# Patient Record
Sex: FEMALE | Race: WHITE | Employment: FULL TIME | URBAN - METROPOLITAN AREA
[De-identification: names, ages, dates, MRNs, and addresses within clinical notes are randomized per-mention and may not be internally consistent; named-entity substitution may affect disease eponyms.]

---

## 2017-03-10 ENCOUNTER — TELEPHONE (OUTPATIENT)
Dept: FAMILY MEDICINE CLINIC | Age: 32
End: 2017-03-10

## 2017-05-23 DIAGNOSIS — M54.5 LOW BACK PAIN, UNSPECIFIED BACK PAIN LATERALITY, UNSPECIFIED CHRONICITY, WITH SCIATICA PRESENCE UNSPECIFIED: Primary | ICD-10-CM

## 2017-05-23 RX ORDER — METHYLPREDNISOLONE 4 MG/1
TABLET ORAL
Qty: 21 KIT | Refills: 0 | Status: SHIPPED | OUTPATIENT
Start: 2017-05-23 | End: 2017-07-18

## 2017-06-12 DIAGNOSIS — M54.5 LOW BACK PAIN, UNSPECIFIED BACK PAIN LATERALITY, UNSPECIFIED CHRONICITY, WITH SCIATICA PRESENCE UNSPECIFIED: ICD-10-CM

## 2017-06-12 DIAGNOSIS — M53.3 SI (SACROILIAC) PAIN: Primary | ICD-10-CM

## 2017-07-18 ENCOUNTER — OFFICE VISIT (OUTPATIENT)
Dept: ORTHOPEDIC SURGERY | Age: 32
End: 2017-07-18

## 2017-07-18 VITALS — HEIGHT: 68 IN | BODY MASS INDEX: 33.48 KG/M2 | WEIGHT: 220.9 LBS

## 2017-07-18 DIAGNOSIS — M79.605 LEFT LEG PAIN: ICD-10-CM

## 2017-07-18 DIAGNOSIS — M54.5 LOW BACK PAIN, UNSPECIFIED BACK PAIN LATERALITY, UNSPECIFIED CHRONICITY, WITH SCIATICA PRESENCE UNSPECIFIED: Primary | ICD-10-CM

## 2017-07-18 DIAGNOSIS — M46.1 SACROILIITIS (HCC): ICD-10-CM

## 2017-07-18 PROBLEM — M79.604 RIGHT LEG PAIN: Status: ACTIVE | Noted: 2017-07-18

## 2017-07-18 PROBLEM — M54.50 LOW BACK PAIN: Status: ACTIVE | Noted: 2017-07-18

## 2017-07-18 PROCEDURE — 99999 PR OFFICE/OUTPT VISIT,PROCEDURE ONLY: CPT | Performed by: FAMILY MEDICINE

## 2017-07-18 RX ORDER — METHYLPREDNISOLONE 4 MG/1
TABLET ORAL
Qty: 21 KIT | Refills: 0 | Status: SHIPPED | OUTPATIENT
Start: 2017-07-18 | End: 2017-07-26 | Stop reason: ALTCHOICE

## 2017-07-25 ENCOUNTER — HOSPITAL ENCOUNTER (OUTPATIENT)
Dept: MRI IMAGING | Age: 32
Discharge: HOME | End: 2017-07-25
Attending: FAMILY MEDICINE | Admitting: FAMILY MEDICINE

## 2017-07-25 DIAGNOSIS — M54.50 LOW BACK PAIN: ICD-10-CM

## 2017-07-25 DIAGNOSIS — M79.605 LEFT LEG PAIN: ICD-10-CM

## 2017-07-25 DIAGNOSIS — M54.5 LOW BACK PAIN, UNSPECIFIED BACK PAIN LATERALITY, UNSPECIFIED CHRONICITY, WITH SCIATICA PRESENCE UNSPECIFIED: ICD-10-CM

## 2017-07-25 DIAGNOSIS — M46.1 SACROILIITIS (HCC): ICD-10-CM

## 2017-07-26 ENCOUNTER — TELEPHONE (OUTPATIENT)
Dept: ORTHOPEDIC SURGERY | Age: 32
End: 2017-07-26

## 2017-07-26 ENCOUNTER — OFFICE VISIT (OUTPATIENT)
Dept: ORTHOPEDIC SURGERY | Age: 32
End: 2017-07-26

## 2017-07-26 VITALS
HEART RATE: 76 BPM | WEIGHT: 220.9 LBS | DIASTOLIC BLOOD PRESSURE: 81 MMHG | SYSTOLIC BLOOD PRESSURE: 129 MMHG | BODY MASS INDEX: 33.48 KG/M2 | HEIGHT: 68 IN

## 2017-07-26 DIAGNOSIS — M48.061 LUMBAR STENOSIS: ICD-10-CM

## 2017-07-26 DIAGNOSIS — M54.16 LUMBAR RADICULITIS: Primary | ICD-10-CM

## 2017-07-26 PROCEDURE — 99203 OFFICE O/P NEW LOW 30 MIN: CPT | Performed by: PHYSICIAN ASSISTANT

## 2017-07-26 RX ORDER — ACETAMINOPHEN AND CODEINE PHOSPHATE 120; 12 MG/5ML; MG/5ML
SOLUTION ORAL
COMMUNITY
Start: 2017-06-29 | End: 2018-09-13 | Stop reason: ALTCHOICE

## 2017-07-27 ENCOUNTER — PAT TELEPHONE (OUTPATIENT)
Dept: PREADMISSION TESTING | Age: 32
End: 2017-07-27

## 2017-08-01 ENCOUNTER — HOSPITAL ENCOUNTER (OUTPATIENT)
Dept: PAIN MANAGEMENT | Age: 32
Discharge: HOME | End: 2017-08-01
Attending: PHYSICAL MEDICINE & REHABILITATION | Admitting: PHYSICAL MEDICINE & REHABILITATION

## 2017-08-01 VITALS
SYSTOLIC BLOOD PRESSURE: 110 MMHG | DIASTOLIC BLOOD PRESSURE: 69 MMHG | HEART RATE: 76 BPM | OXYGEN SATURATION: 100 % | TEMPERATURE: 98.4 F | RESPIRATION RATE: 16 BRPM | BODY MASS INDEX: 32.58 KG/M2 | WEIGHT: 215 LBS | HEIGHT: 68 IN

## 2017-08-01 LAB — PREGNANCY, URINE: NEGATIVE

## 2017-08-01 RX ORDER — IBUPROFEN 800 MG/1
800 TABLET ORAL EVERY 6 HOURS PRN
COMMUNITY
End: 2017-09-15 | Stop reason: ALTCHOICE

## 2017-08-01 ASSESSMENT — ACTIVITIES OF DAILY LIVING (ADL): EFFECT OF PAIN ON DAILY ACTIVITIES: STANDING

## 2017-08-01 ASSESSMENT — PAIN DESCRIPTION - DESCRIPTORS: DESCRIPTORS: BURNING;ACHING;TINGLING

## 2017-08-01 ASSESSMENT — PAIN - FUNCTIONAL ASSESSMENT
PAIN_FUNCTIONAL_ASSESSMENT: 0-10
PAIN_FUNCTIONAL_ASSESSMENT: 0-10

## 2017-08-14 ENCOUNTER — TELEPHONE (OUTPATIENT)
Dept: ORTHOPEDIC SURGERY | Age: 32
End: 2017-08-14

## 2017-08-15 ENCOUNTER — HOSPITAL ENCOUNTER (OUTPATIENT)
Dept: PAIN MANAGEMENT | Age: 32
Discharge: HOME | End: 2017-08-15
Attending: PHYSICAL MEDICINE & REHABILITATION | Admitting: PHYSICAL MEDICINE & REHABILITATION

## 2017-08-15 VITALS
OXYGEN SATURATION: 100 % | DIASTOLIC BLOOD PRESSURE: 72 MMHG | WEIGHT: 210 LBS | BODY MASS INDEX: 31.83 KG/M2 | HEART RATE: 84 BPM | HEIGHT: 68 IN | SYSTOLIC BLOOD PRESSURE: 110 MMHG | TEMPERATURE: 97.9 F | RESPIRATION RATE: 16 BRPM

## 2017-08-15 LAB — PREGNANCY, URINE: NEGATIVE

## 2017-08-15 ASSESSMENT — PAIN DESCRIPTION - DESCRIPTORS: DESCRIPTORS: ACHING

## 2017-08-15 ASSESSMENT — PAIN - FUNCTIONAL ASSESSMENT
PAIN_FUNCTIONAL_ASSESSMENT: 0-10
PAIN_FUNCTIONAL_ASSESSMENT: 0-10

## 2017-08-15 ASSESSMENT — ACTIVITIES OF DAILY LIVING (ADL): EFFECT OF PAIN ON DAILY ACTIVITIES: STANDING

## 2017-08-23 RX ORDER — MELOXICAM 15 MG/1
TABLET ORAL
Qty: 30 TABLET | Refills: 1 | Status: SHIPPED | OUTPATIENT
Start: 2017-08-23 | End: 2017-10-03 | Stop reason: ALTCHOICE

## 2017-09-15 ENCOUNTER — OFFICE VISIT (OUTPATIENT)
Dept: FAMILY MEDICINE CLINIC | Age: 32
End: 2017-09-15

## 2017-09-15 VITALS
BODY MASS INDEX: 31.61 KG/M2 | TEMPERATURE: 98.2 F | DIASTOLIC BLOOD PRESSURE: 70 MMHG | HEIGHT: 68 IN | WEIGHT: 208.6 LBS | SYSTOLIC BLOOD PRESSURE: 118 MMHG

## 2017-09-15 DIAGNOSIS — K62.5 RECTAL BLEEDING: ICD-10-CM

## 2017-09-15 DIAGNOSIS — K62.5 RECTAL BLEEDING: Primary | ICD-10-CM

## 2017-09-15 LAB
HCT VFR BLD CALC: 38.6 % (ref 36–48)
HEMOGLOBIN: 13.4 G/DL (ref 12–16)
MCH RBC QN AUTO: 30.1 PG (ref 26–34)
MCHC RBC AUTO-ENTMCNC: 34.8 G/DL (ref 31–36)
MCV RBC AUTO: 86.6 FL (ref 80–100)
PDW BLD-RTO: 12.8 % (ref 12.4–15.4)
PLATELET # BLD: 205 K/UL (ref 135–450)
PMV BLD AUTO: 7 FL (ref 5–10.5)
RBC # BLD: 4.46 M/UL (ref 4–5.2)
WBC # BLD: 7.4 K/UL (ref 4–11)

## 2017-09-15 PROCEDURE — 99213 OFFICE O/P EST LOW 20 MIN: CPT | Performed by: FAMILY MEDICINE

## 2017-09-18 ENCOUNTER — OFFICE VISIT (OUTPATIENT)
Dept: ORTHOPEDIC SURGERY | Age: 32
End: 2017-09-18

## 2017-09-18 VITALS
WEIGHT: 208.56 LBS | BODY MASS INDEX: 31.61 KG/M2 | SYSTOLIC BLOOD PRESSURE: 123 MMHG | HEART RATE: 77 BPM | DIASTOLIC BLOOD PRESSURE: 77 MMHG | HEIGHT: 68 IN

## 2017-09-18 DIAGNOSIS — M48.061 LUMBAR STENOSIS: ICD-10-CM

## 2017-09-18 DIAGNOSIS — M54.16 LUMBAR RADICULITIS: Primary | ICD-10-CM

## 2017-09-18 PROCEDURE — 99213 OFFICE O/P EST LOW 20 MIN: CPT | Performed by: PHYSICAL MEDICINE & REHABILITATION

## 2017-09-18 RX ORDER — DICLOFENAC SODIUM 75 MG/1
75 TABLET, DELAYED RELEASE ORAL 2 TIMES DAILY
Qty: 180 TABLET | Refills: 3 | Status: SHIPPED | OUTPATIENT
Start: 2017-09-18 | End: 2018-09-13 | Stop reason: ALTCHOICE

## 2017-09-26 ENCOUNTER — TELEPHONE (OUTPATIENT)
Dept: ORTHOPEDIC SURGERY | Age: 32
End: 2017-09-26

## 2017-10-03 ENCOUNTER — HOSPITAL ENCOUNTER (OUTPATIENT)
Dept: PAIN MANAGEMENT | Age: 32
Discharge: HOME | End: 2017-10-03
Attending: PHYSICAL MEDICINE & REHABILITATION | Admitting: PHYSICAL MEDICINE & REHABILITATION

## 2017-10-03 VITALS
OXYGEN SATURATION: 100 % | TEMPERATURE: 97.4 F | HEIGHT: 68 IN | DIASTOLIC BLOOD PRESSURE: 69 MMHG | RESPIRATION RATE: 14 BRPM | HEART RATE: 65 BPM | SYSTOLIC BLOOD PRESSURE: 107 MMHG | WEIGHT: 210 LBS | BODY MASS INDEX: 31.83 KG/M2

## 2017-10-03 LAB — PREGNANCY, URINE: NEGATIVE

## 2017-10-03 ASSESSMENT — PAIN DESCRIPTION - DESCRIPTORS: DESCRIPTORS: RADIATING;SHARP;BURNING

## 2017-10-03 ASSESSMENT — PAIN - FUNCTIONAL ASSESSMENT
PAIN_FUNCTIONAL_ASSESSMENT: 0-10
PAIN_FUNCTIONAL_ASSESSMENT: 0-10

## 2017-10-03 ASSESSMENT — ACTIVITIES OF DAILY LIVING (ADL): EFFECT OF PAIN ON DAILY ACTIVITIES: SITTING

## 2017-10-03 NOTE — IP AVS SNAPSHOT
Meningococcal MCV4P (Menactra) 2001 -- -- --    Td 2000 -- -- --    Tdap (Boostrix, Adacel) 2014 -- -- --    Comment: [facility]      Last Vitals          Most Recent Value    Temp  97.4 °F (36.3 °C)    Pulse  58    Resp  12    BP  106/77         After Visit Summary    This summary was created for you. Thank you for entrusting your care to us. The following information includes details about your hospital/visit stay along with steps you should take to help with your recovery once you leave the hospital.  In this packet, you will find information about the topics listed below:    · Instructions about your medications including a list of your home medications  · A summary of your hospital visit  · Follow-up appointments once you have left the hospital  · Your care plan at home      You may receive a survey regarding the care you received during your stay. Your input is valuable to us. We encourage you to complete and return your survey in the envelope provided. We hope you will choose us in the future for your healthcare needs. Patient Information     Patient Name PHIL Barrios Brochure 1985      Care Provided at:     Name Address Phone       [facility] [address] [phone]            Your Visit    Here you will find information about your visit, including the reason for your visit. Please take this sheet with you when you visit your doctor or other health care provider in the future. It will help determine the best possible medical care for you at that time. If you have any questions once you leave the hospital, please call the department phone number listed below. Why you were here     Your primary diagnosis was:  Not on File      Visit Information     Date & Time Provider Department Dept.  Phone    10/3/2017 Phu Palumbo MD [address] [place] [address] THERAPY [phone]       Follow-up Appointments Below is a list of your follow-up and future appointments. This may not be a complete list as you may have made appointments directly with providers that we are not aware of or your providers may have made some for you. Please call your providers to confirm appointments. It is important to keep your appointments. Please bring your current insurance card, photo ID, co-pay, and all medication bottles to your appointment. If self-pay, payment is expected at the time of service. Future Appointments     10/3/2017 12:45 PM     Appointment with Marizol Ventura MD; [address] [facility] [place] [address] ([phone])   [address] [facility] [id number]       10/16/2017 3:40 PM     Appointment with Jen Perez PA-C at [place] ([phone])   Please arrive 15 minutes prior to appointment, bring photo ID and insurance card. [id number]        Date Due    Yearly Flu Vaccine (1) 9/1/2017    Pap Smear 8/6/2018    Tetanus Combination Vaccine (7 - Td) 4/5/2024                 Care Plan Once You Return Home    This section includes instructions you will need to follow once you leave the hospital.  Your care team will discuss these with you, so you and those caring for you know how to best care for your health needs at home. This section may also include educational information about certain health topics that may be of help to you. Discharge Instructions       [address] [place] [address]          [phone]  POST EPIDURAL STEROID INJECTION    PATIENT INSTRUCTIONS:  1)  DIET     RESUME NORMAL DIET     RESUME ALL PRIOR MEDICATIONS  2)  ACTIVITY     DO NOT STAY ALONE FOR 4-6 HOURS AFTER THE PROCEDURE    REST TODAY    DO NOT DRIVE TODAY AND 24 HOURS IF YOU RECEIVED SEDATION. IF YOU ARE   SEEN DRIVING DURING THIS TIME THE PROPER AUTHORITIES WILL BE NOTIFIED.   DO NOT SIGN ANY LEGAL DOCUMENTS, MAKE ANY MAJOR DECISIONS, OR BE INVOLVED IN WORK DECISIONS FOR THE REMAINDER OF THE DAY.   RESUME NORMAL ACTIVITIES. DO NOT OVER EXERT YOURSELF.    SHOWER OR BATHE AS NORMAL  3)  SITE CARE     MAY USE ICE TO SITE IF NEEDED                KEEP SITE DRY FOR 12 HOURS. IF A BAND-AID WAS APPLIED TO THE                 INJECTION  SITE REMOVE BAND-AID THE FOLLOWING DAY    OBSERVE PUNCTURE SITE FOR SIGNS OF INFECTION (REDNESS, WARMTH, SWELLING, PURULENT DRAINAGE, INCREASED TENDERNESS OR FEVER GREATER THAN 101)    REPORT SIGNS OF INFECTION TO THE PHYSICIAN  4)  EXPECTED SIDE EFFECTS     FLUID RETENTION     NERVOUS ENERGY     SLEEPLESSNESS     MUSCLE SPASMS   TEMPORARY WEAKNESS/TINGLING/NUMBNESS IN THE EXTREMITIES    PAIN AT INJECTION SITE     DROWSINESS    POSSIBLE ELEVATION OF BLOOD SUGARS IF YOU ARE DIABETIC  5)  TO REACH DR. KING    CALL IF SYMPTOMS WORSEN SEVERELY OR FOR A SEVERE HEADACHE THAT WORSENS WHEN UPRIGHT       CALL IF YOU DEVELOP A FEVER GREATER THAN 101    CALL [phone] TO MAKE A FOLLOW UP APPOINTMENT WITH DR. Candy Nance     CALL REFERRING DOCTOR IF ANY PROBLEMS   6)  ADDITIONAL INSTRUCTIONS    IT MAY TAKE 24 TO 39 HOURS TO FEEL IMPROVEMENT            MyChart Signup     Our records indicate that you have an active [employer] account. You can view your After Visit Summary by going to [email or web address] and logging in with your [employer] username and password. If you don't have a [employer] username and password but a parent or guardian has access to your record, the parent or guardian should login with their own [employer] username and password and access your record to view the After Visit Summary. Additional Information  If you have questions, please contact the physician practice where you receive care. Remember, [employer] is NOT to be used for urgent needs. For medical emergencies, dial 911. For questions regarding your [employer] account call [phone]. If you have a clinical question, please call your doctor's office. View your information online  ? Review your current list of  medications, immunization, and allergies. ? Review your future test results online . ? Review your discharge instructions provided by your caregivers at discharge    Certain functionality such as prescription refills, scheduling appointments or sending messages to your provider are not activated if your provider does not use [employer] in his/her office    For questions regarding your [employer] account call [phone]. If you have a clinical question, please call your doctor's office. The information on all pages of the After Visit Summary has been reviewed with me, the patient and/or responsible adult, by my health care provider(s). I had the opportunity to ask questions regarding this information. I understand I should dispose of my armband safely at home to protect my health information. A complete copy of the After Visit Summary has been given to me, the patient and/or responsible adult.            Patient Signature/Responsible Adult:____________________    Clinician Signature:_____________________    Date:_____________________    Time:_____________________

## 2017-10-03 NOTE — IP AVS SNAPSHOT
Patient Information     Patient Name PHIL Recio Both 1985         This is your updated medication list to keep with you all times      TAKE these medications     JEN 0.35 MG tablet   Generic drug:  norethindrone       diclofenac 75 MG EC tablet   Commonly known as:  VOLTAREN   Take 1 tablet by mouth 2 times daily       PRENATAL 1 PO         ASK your doctor about these medications     COLACE PO

## 2017-10-04 NOTE — OP NOTE
Patient:  Brenda Irwin Record #:  [id number]   Date:  10-3-17  Physician:  Antonia August M.D. Facility: [facility]     Pre-op diagnosis: Lumbar spondylosis, lumbar radiculitis  Post-op diagnosis:  same  Procedure: Left L5/S1 interlaminar epidural injection #3 with flouroscopic guidance  Anesthesia: Local  Procedure Note:    The patient was admitted through pre-op and written consent was obtained. The patient was advised of the risks and benefits of the procedure, including but not limited to the following: bleeding, pain, infection, temporary paralysis, nerve damage and spinal headache. The patient was given the opportunity to ask questions. There were no contraindications for this procedure. The appropriate area was prepped and draped in a sterile fashion. Landmarks were identified and marked. The skin and soft tissues were anesthetized with 1% lidocaine. A 20G 4.5 inch Touhy needle was advanced to the left* L5/S1 interlaminar space using fluoroscopic guidance with ideal needle tip placement confirmed by multiple views. Injection of contrast showed epidural flow. There were no signs of intravascular or intrathecal injection. 80 mg depomedrol and 1cc 1% lidocaine were then injected. There were no complications and the patient tolerated the procedure well. The patient was transferred to the recovery area and monitored. Discharge instructions were given. The patient is to contact me for any post-procedure concerns. The patient is to follow up as scheduled.     DANUTA:2 cm     Antonia August MD

## 2017-10-16 ENCOUNTER — OFFICE VISIT (OUTPATIENT)
Dept: ORTHOPEDIC SURGERY | Age: 32
End: 2017-10-16

## 2017-10-16 VITALS
SYSTOLIC BLOOD PRESSURE: 115 MMHG | HEIGHT: 68 IN | HEART RATE: 69 BPM | DIASTOLIC BLOOD PRESSURE: 72 MMHG | WEIGHT: 210.1 LBS | BODY MASS INDEX: 31.84 KG/M2

## 2017-10-16 DIAGNOSIS — M48.061 SPINAL STENOSIS OF LUMBAR REGION, UNSPECIFIED WHETHER NEUROGENIC CLAUDICATION PRESENT: Primary | ICD-10-CM

## 2017-10-16 DIAGNOSIS — M54.16 LUMBAR RADICULITIS: ICD-10-CM

## 2017-10-16 PROCEDURE — 99212 OFFICE O/P EST SF 10 MIN: CPT | Performed by: PHYSICIAN ASSISTANT

## 2017-10-16 NOTE — PROGRESS NOTES
Follow up Injection: SPINE    CHIEF COMPLAINT:    Chief Complaint   Patient presents with    Back Pain     F/u EVANGELIST       HISTORY OF PRESENT ILLNESS:                The patient is a 33 y.o. female  w/Dr. Shannan Camilo, here to follow up after left L5-S1 LESI #3 from 10/3/2017 for a 9-10 month history now of aching low back pain radiating into the left buttock and lateral calf with numbness and burning. Symptoms initially increased with prolonged standing and transition. Some relief with stretching. Currently states she is overall 80% improved at this time. She reports increased functionality and is now able to advance in some of her home exercises. Currently denies any significant lower extremity radiating pain, no progressive numbness tingling weakness. No side effects the procedure. 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 Headache: no              2.Cramping:  no    3. Fever/Chills: no            4. Other: no    Past Medical History: Medical history form was reviewed & scanned into the chart until Media tab  History reviewed. No pertinent past medical history. REVIEW OF SYSTEMS:   CONSTITUTIONAL: Denies unexplained weight loss, fevers, chills or fatigue  NEUROLOGIC: Denies tremors or seizures         PHYSICAL EXAM:    Vitals: Blood pressure 115/72, pulse 69, height 5' 7.99\" (1.727 m), weight 210 lb 1.6 oz (95.3 kg), unknown if currently breastfeeding. GENERAL EXAM:  · General Apparence: Patient is adequately groomed with no evidence of malnutrition. · Orientation: The patient is oriented to time, place and person.    · Mood & Affect:The patient's mood and affect are appropriate above  2) Cont Diclofenac 75mg I po BID PRN  3) F/u PRN            [facility]

## 2017-10-20 ENCOUNTER — HOSPITAL ENCOUNTER (OUTPATIENT)
Dept: PHYSICAL THERAPY | Age: 32
Discharge: HOME | End: 2017-10-31
Admitting: PHYSICIAN ASSISTANT

## 2017-10-20 NOTE — FLOWSHEET NOTE
[place] [address] [facility], [address] [place]  Phone: [phone]  Fax [phone]    Physical Therapy Daily Treatment Note  Date:  10/24/2017    Patient Name:  Alexandru Gill    :  1985  MRN: [id number]  Restrictions/Precautions:    Physician Information:  Referring Practitioner: Dr. Tracy Matthews  Medical/Treatment Diagnosis Information:  Diagnosis: M54.16 (ICD-10-CM) - Lumbar radiculitis, M48.061 (ICD-10-CM) - Spinal stenosis of lumbar region, unspecified whether neurogenic claudication present  Treatment Diagnosis:M54.16 (ICD-10-CM) - Lumbar radiculitis                                         [x] Conservative / [] Surgical - DOS:  Therapy Diagnosis/Practice Pattern:  Practice Pattern F: Spinal Disorders  Insurance/Certification information:  PT Insurance Information: 10/20/17 MED MUT - $0CP - $750DED(MET) - PT/OT MED NEC - 100% - NO AUTH  Plan of care signed: [] YES  [] NO  Number of Comorbidities:  [x]0     []1-2    []3+  Date of Patient follow up with Physician:     G-Code (if applicable):      Date G-Code Applied:  10/20/17  PT G-Codes  Functional Assessment Tool Used: modified oswestry  Score: 20%  Functional Limitation: Changing and maintaining body position  Changing and Maintaining Body Position Current Status (): At least 20 percent but less than 40 percent impaired, limited or restricted  Changing and Maintaining Body Position Goal Status ():  At least 1 percent but less than 20 percent impaired, limited or restricted    Progress Note: [x]  Yes  []  No  Next due by: Visit #10        Latex Allergy:  [x]NO      []YES  Preferred Language for Healthcare:   [x]English       []other:    Visit # Insurance Allowable Reporting Period   1 BMN Begin Date: 10/24/2017               End Date:      RECERT DUE BY:     Pain level:  1-5/10     SUBJECTIVE:  See eval    OBJECTIVE: See eval  Observation:  Palpation:     Test motor skill, proprioception of scapular, scapulothoracic and UE control with self care, reaching, carrying, lifting, house/yardwork, driving/computer work      Manual Treatments:  PROM / STM / Oscillations-Mobs:  G-I, II, III, IV (PA's, Inf., Post.)  [x] (05581) Provided manual therapy to mobilize soft tissue/joints of cervical/CT, scapular GHJ and UE for the purpose of modulating pain, promoting relaxation,  increasing ROM, reducing/eliminating soft tissue swelling/inflammation/restriction, improving soft tissue extensibility and allowing for proper ROM for normal function with self care, reaching, carrying, lifting, house/yardwork, driving/computer work    Modalities:  PM/HP x15'    Charges:  Timed Code Treatment Minutes: 25   Total Treatment Minutes: 60     [x] EVAL (LOW) 50760 (typically 20 minutes face-to-face)  [] EVAL (MOD) 73939 (typically 30 minutes face-to-face)  [] EVAL (HIGH) 72488 (typically 45 minutes face-to-face)  [] RE-EVAL     [x] DQ(78674) x  1   [] IONTO  [] NMR (87404) x      [] VASO  [x] Manual (50577) x  1    [] Other:  [] TA x       [] Mech Traction (93300)  [] ES(attended) (66403)      [x] ES (un) (11664):     GOALS:  Patient stated goal: To be able to wake and exercise without pain    Therapist goals for Patient:   Short Term Goals: To be achieved in: 2 weeks  1. Independent in HEP and progression per patient tolerance, in order to prevent re-injury. 2. Patient will have a decrease in pain to facilitate improvement in movement, function, and ADLs as indicated by Functional Deficits. Long Term Goals: To be achieved in: 6 weeks  1. Disability index score of 10% or less for the [place] to assist with reaching prior level of function. 2. Patient will demonstrate increased AROM to WNL, good LS mobility, good hip ROM to allow for proper joint functioning as indicated by patients Functional Deficits.    3. Patient will demonstrate an increase in Strength to good proximal hip and core activation to allow for proper functional mobility as indicated by patients Functional Deficits. 4. Patient will return to prolonged position (>30 min)  functional activities without increased symptoms or restriction. 5. Pt. To be able to wake without pain (patient specific functional goal)          New or Updated Goals (if applicable):  [x] No change to goals established upon initial eval/last progress note:  New Goals:    Progression Towards Functional goals:   [] Patient is progressing as expected towards functional goals listed. [] Progression is slowed due to complexities listed. [] Progression has been slowed due to co-morbidities.   [x] Plan just implemented, too soon to assess goals progression  [] Other:     ASSESSMENT:    [] Improvement noted relative to goals:  [] No Improvement noted related to goals:  Summary/Patient's response to treatment: See Eval    Treatment/Activity Tolerance:  [x] Patient tolerated treatment well [] Patient limited by fatique  [] Patient limited by pain  [] Patient limited by other medical complications  [] Other:     Prognosis: [x] Good [] Fair  [] Poor    Patient Requires Follow-up: [x] Yes  [] No    PLAN: See eval  [] Continue per plan of care [] Alter current plan (see comments)  [x] Plan of care initiated [] Hold pending MD visit [] Discharge    Electronically signed by: Bia Dumont, PT  [id number]

## 2017-10-25 DIAGNOSIS — M51.26 HNP (HERNIATED NUCLEUS PULPOSUS), LUMBAR: Primary | ICD-10-CM

## 2017-11-01 ENCOUNTER — HOSPITAL ENCOUNTER (OUTPATIENT)
Dept: PHYSICAL THERAPY | Age: 32
Discharge: HOME | End: 2017-11-30
Attending: PHYSICIAN ASSISTANT | Admitting: PHYSICIAN ASSISTANT

## 2017-11-02 ENCOUNTER — TELEPHONE (OUTPATIENT)
Dept: ORTHOPEDIC SURGERY | Age: 32
End: 2017-11-02

## 2017-11-02 NOTE — TELEPHONE ENCOUNTER
Ana reports acute/chronic worsening aching low back pain radiating into the left buttock, lateral calf with numbness into the foot. Pain is been more severe over the last 2 weeks. Symptoms are fairly constant at this time. Pain is increased with any standing or transition. Some relief with stretching. She has now been through recent physical therapy over the last 3 months, she has had temporary relief with 3 EVANGELIST detailed below, she has tried pharmacologic care including NSAIDs, oral steroids, muscle relaxers. She feels the symptoms are worsening. She denies any progressive weakness or recent bowel or bladder changes.   Dr. Dorian Wagner, orthopedic spine surgeon has recommended updated lumbar MRI scan without contrast.    8/1/17: Lt L4-5 [address] [facility]  8/15/17 Lt L4-5 [address] [facility]ANGELIST  10/3/17: Lt L5-S1 YAZAN Disla, PAC

## 2017-11-03 ENCOUNTER — HOSPITAL ENCOUNTER (OUTPATIENT)
Dept: PHYSICAL THERAPY | Age: 32
Discharge: HOME | End: 2017-11-03
Admitting: PHYSICIAN ASSISTANT

## 2017-11-03 NOTE — FLOWSHEET NOTE
Treatments:  PROM / STM / Oscillations-Mobs:  G-I, II, III, IV (PA's, Inf., Post.)  [x] (09760) Provided manual therapy to mobilize soft tissue/joints of cervical/CT, scapular GHJ and UE for the purpose of modulating pain, promoting relaxation,  increasing ROM, reducing/eliminating soft tissue swelling/inflammation/restriction, improving soft tissue extensibility and allowing for proper ROM for normal function with self care, reaching, carrying, lifting, house/yardwork, driving/computer work    Modalities:  PM/HP x15'    Charges:  Timed Code Treatment Minutes: 35   Total Treatment Minutes: 60     [] EVAL (LOW) 08292 (typically 20 minutes face-to-face)  [] EVAL (MOD) 41097 (typically 30 minutes face-to-face)  [] EVAL (HIGH) 29837 (typically 45 minutes face-to-face)  [] RE-EVAL     [x] QS(20302) x  1   [] IONTO  [] NMR (56681) x      [] VASO  [x] Manual (14844) x  1    [] Other:  [] TA x       [] Mech Traction (00088)  [] ES(attended) (24159)      [x] ES (un) (06974):     GOALS:  Patient stated goal: To be able to wake and exercise without pain    Therapist goals for Patient:   Short Term Goals: To be achieved in: 2 weeks  1. Independent in HEP and progression per patient tolerance, in order to prevent re-injury. 2. Patient will have a decrease in pain to facilitate improvement in movement, function, and ADLs as indicated by Functional Deficits. Long Term Goals: To be achieved in: 6 weeks  1. Disability index score of 10% or less for the [place] to assist with reaching prior level of function. 2. Patient will demonstrate increased AROM to WNL, good LS mobility, good hip ROM to allow for proper joint functioning as indicated by patients Functional Deficits. 3. Patient will demonstrate an increase in Strength to good proximal hip and core activation to allow for proper functional mobility as indicated by patients Functional Deficits.    4. Patient will return to prolonged position (>30 min)  functional activities

## 2017-11-08 ENCOUNTER — HOSPITAL ENCOUNTER (OUTPATIENT)
Dept: PHYSICAL THERAPY | Age: 32
Discharge: HOME | End: 2017-11-08
Admitting: PHYSICIAN ASSISTANT

## 2017-11-08 NOTE — FLOWSHEET NOTE
[facility], [address] [place] [address] [place]  Phone: [phone]  Fax [phone]    Physical Therapy Daily Treatment Note  Date:  2017    Patient Name:  Sean Rolon    :  1985  MRN: [id number]  Restrictions/Precautions:    Physician Information:  Referring Practitioner: Dr. Leti Dempsey  Medical/Treatment Diagnosis Information:  Diagnosis: M54.16 (ICD-10-CM) - Lumbar radiculitis, M48.061 (ICD-10-CM) - Spinal stenosis of lumbar region, unspecified whether neurogenic claudication present  Treatment Diagnosis:M54.16 (ICD-10-CM) - Lumbar radiculitis                                         [x] Conservative / [] Surgical - DOS:  Therapy Diagnosis/Practice Pattern:  Practice Pattern F: Spinal Disorders  Insurance/Certification information:  PT Insurance Information: 10/20/17 MED MUT - $0CP - $750DED(MET) - PT/OT MED NEC - 100% - NO AUTH  Plan of care signed: [] YES  [] NO  Number of Comorbidities:  [x]0     []1-2    []3+  Date of Patient follow up with Physician:     G-Code (if applicable):      Date G-Code Applied:  10/20/17       Progress Note: [x]  Yes  []  No  Next due by: Visit #10        Latex Allergy:  [x]NO      []YES  Preferred Language for Healthcare:   [x]English       []other:    Visit # Insurance Allowable Reporting Period   3 BMN Begin Date: 2017               End Date:      RECERT DUE BY:     Pain level:  1-5/10 Into L buttock at present    SUBJECTIVE:  Pt. Reports that the pain is variable into the leg, sometimes it is better, and sometimes it is worse. Sometimes the press ups help and sometimes no change. Having a repeat MRI tomorrow and has an appt. With Dr. Paige Garnett for consult. OBJECTIVE: See eval  Observation:supine to sit stays =;(-) Seated FF  Palpation:L QL and piriformis     Test used Initial score Current Score   Pain Summary VAS     Functional questionnaire MOD.  OS     ROM flexion      ER to improving balance, coordination, kinesthetic sense, posture, motor skill, proprioception of scapular, scapulothoracic and UE control with self care, reaching, carrying, lifting, house/yardwork, driving/computer work      Manual Treatments:  PROM / STM / Oscillations-Mobs:  G-I, II, III, IV (PA's, Inf., Post.)  [x] (48044) Provided manual therapy to mobilize soft tissue/joints of cervical/CT, scapular GHJ and UE for the purpose of modulating pain, promoting relaxation,  increasing ROM, reducing/eliminating soft tissue swelling/inflammation/restriction, improving soft tissue extensibility and allowing for proper ROM for normal function with self care, reaching, carrying, lifting, house/yardwork, driving/computer work    Modalities:   no time    Charges:  Timed Code Treatment Minutes: 45   Total Treatment Minutes: 45     [] EVAL (LOW) 38964 (typically 20 minutes face-to-face)  [] EVAL (MOD) 62311 (typically 30 minutes face-to-face)  [] EVAL (HIGH) 07935 (typically 45 minutes face-to-face)  [] RE-EVAL     [x] XL(41441) x  1   [] IONTO  [x] NMR (64439) x  1   [] VASO  [x] Manual (41073) x  1    [] Other:  [] TA x       [] Mech Traction (64224)  [] ES(attended) (31506)      [] ES (un) (36144):     GOALS:  Patient stated goal: To be able to wake and exercise without pain    Therapist goals for Patient:   Short Term Goals: To be achieved in: 2 weeks  1. Independent in HEP and progression per patient tolerance, in order to prevent re-injury. 2. Patient will have a decrease in pain to facilitate improvement in movement, function, and ADLs as indicated by Functional Deficits. Long Term Goals: To be achieved in: 6 weeks  1. Disability index score of 10% or less for the [place] to assist with reaching prior level of function. 2. Patient will demonstrate increased AROM to WNL, good LS mobility, good hip ROM to allow for proper joint functioning as indicated by patients Functional Deficits.    3. Patient will demonstrate an increase in Strength to good proximal hip and core activation to allow for proper functional mobility as indicated by patients Functional Deficits. 4. Patient will return to prolonged position (>30 min)  functional activities without increased symptoms or restriction. 5. Pt. To be able to wake without pain (patient specific functional goal)          New or Updated Goals (if applicable):  [x] No change to goals established upon initial eval/last progress note:  New Goals:    Progression Towards Functional goals:   [] Patient is progressing as expected towards functional goals listed. [] Progression is slowed due to complexities listed. [] Progression has been slowed due to co-morbidities. [x] Plan just implemented, too soon to assess goals progression  [] Other:     ASSESSMENT:    [] Improvement noted relative to goals:  [] No Improvement noted related to goals:  Summary/Patient's response to treatment: Pt. Needs to cont. To avoid flexion, unload often and may begin controlled TA exercises prone with neutral spine. Pt. To track the presence of L LE pain throughout the day. Able to abolish pain with above TE, briefly returned following seated FF to check SIJ, no pain following rising from prone.   Treatment/Activity Tolerance:  [x] Patient tolerated treatment well [] Patient limited by fatique  [] Patient limited by pain  [] Patient limited by other medical complications  [] Other:     Prognosis: [x] Good [] Fair  [] Poor    Patient Requires Follow-up: [x] Yes  [] No    PLAN: See eval  [x] Continue per plan of care [] Alter current plan (see comments)  [] Plan of care initiated [] Hold pending MD visit [] Discharge    Electronically signed by: James Corley, PT  [id number]

## 2017-11-09 ENCOUNTER — HOSPITAL ENCOUNTER (OUTPATIENT)
Dept: MRI IMAGING | Age: 32
Discharge: HOME | End: 2017-11-09
Attending: PHYSICAL MEDICINE & REHABILITATION | Admitting: PHYSICAL MEDICINE & REHABILITATION

## 2017-11-09 DIAGNOSIS — M51.26 HNP (HERNIATED NUCLEUS PULPOSUS), LUMBAR: ICD-10-CM

## 2017-11-13 ENCOUNTER — OFFICE VISIT (OUTPATIENT)
Dept: ORTHOPEDIC SURGERY | Age: 32
End: 2017-11-13

## 2017-11-13 VITALS
WEIGHT: 210.1 LBS | BODY MASS INDEX: 31.84 KG/M2 | SYSTOLIC BLOOD PRESSURE: 112 MMHG | HEART RATE: 69 BPM | DIASTOLIC BLOOD PRESSURE: 74 MMHG | HEIGHT: 68 IN

## 2017-11-13 DIAGNOSIS — M51.16 LUMBAR DISC HERNIATION WITH RADICULOPATHY: Primary | ICD-10-CM

## 2017-11-13 NOTE — PROGRESS NOTES
has a positive straight leg raise on the left and a positive crossed straight leg raise. Achilles and quadriceps reflexes are 1+. Sensation is intact to light touch L3 to S1 bilaterally. She has no clonus. Hip range of motion painless. Imaging:  I reviewed MRI images of her lumbar spine. They show a persistent left paracentral disc herniation L4-L5 with lateral recess stenosis. There has been little change in her MRI images between July 25, 2017 and November 9, 2017. Assessment:  Left paracentral disc herniation L4-L5 with L5 radiculopathy    Plan:  We discussed treatment options including observation, additional oral steroids, physical therapy, epidural injection and microlumbar discectomy. She wishes to proceed with microdiscectomy. We discussed the risks, benefits, and alternatives to surgery including the risks of nerve or vessel injury, paralysis, spinal blood clot, spinal fluid leak, death, infection, need for additional surgery for recurrent herniation or low back pain, failure of surgery to alleviate her symptoms and worse symptoms following surgery. She understands these risks and wishes to proceed with surgery.  Her questions were answered

## 2017-11-15 ENCOUNTER — HOSPITAL ENCOUNTER (OUTPATIENT)
Dept: PHYSICAL THERAPY | Age: 32
Discharge: HOME | End: 2017-11-15
Admitting: PHYSICIAN ASSISTANT

## 2017-11-15 NOTE — FLOWSHEET NOTE
[facility], [address] [place] [address] [place]  Phone: [phone]  Fax [phone]    Physical Therapy Daily Treatment Note  Date:  11/15/2017    Patient Name:  Ti Cabrales    :  1985  MRN: [id number]  Restrictions/Precautions:    Physician Information:  Referring Practitioner: Dr. Swapnil Slater  Medical/Treatment Diagnosis Information:  Diagnosis: M54.16 (ICD-10-CM) - Lumbar radiculitis, M48.061 (ICD-10-CM) - Spinal stenosis of lumbar region, unspecified whether neurogenic claudication present  Treatment Diagnosis:M54.16 (ICD-10-CM) - Lumbar radiculitis                                         [x] Conservative / [] Surgical - DOS:  Therapy Diagnosis/Practice Pattern:  Practice Pattern F: Spinal Disorders  Insurance/Certification information:  PT Insurance Information: 10/20/17 MED MUT - $0CP - $750DED(MET) - PT/OT MED NEC - 100% - NO AUTH  Plan of care signed: [] YES  [] NO  Number of Comorbidities:  [x]0     []1-2    []3+  Date of Patient follow up with Physician:     G-Code (if applicable):      Date G-Code Applied:  10/20/17       Progress Note: [x]  Yes  []  No  Next due by: Visit #10        Latex Allergy:  [x]NO      []YES  Preferred Language for Healthcare:   [x]English       []other:    Visit # Insurance Allowable Reporting Period   4 BMN Begin Date: 11/15/2017               End Date:      RECERT DUE BY: 35    Pain level:  1-5/10 Into L buttock at present    SUBJECTIVE:  Pt. Reports that she saw Dr. Jorge L Verdugo on Mon. And is planning on having a discectomy in about 1 mos. The Dr. Amy Olivas that part of the disc material was adhered to the nerve. OBJECTIVE: See eval  Observation:supine to sit stays =;(-) Seated FF  Palpation:L QL and piriformis     Test used Initial score Current Score   Pain Summary VAS     Functional questionnaire MOD.  OS     ROM flexion      ER                 Strength flexion      ER      ABD      IR activities related to improving balance, coordination, kinesthetic sense, posture, motor skill, proprioception of scapular, scapulothoracic and UE control with self care, reaching, carrying, lifting, house/yardwork, driving/computer work      Manual Treatments:  PROM / STM / Oscillations-Mobs:  G-I, II, III, IV (PA's, Inf., Post.)  [x] (30067) Provided manual therapy to mobilize soft tissue/joints of cervical/CT, scapular GHJ and UE for the purpose of modulating pain, promoting relaxation,  increasing ROM, reducing/eliminating soft tissue swelling/inflammation/restriction, improving soft tissue extensibility and allowing for proper ROM for normal function with self care, reaching, carrying, lifting, house/yardwork, driving/computer work    Modalities:   no time    Charges:  Timed Code Treatment Minutes: 45   Total Treatment Minutes: 45     [] EVAL (LOW) 53141 (typically 20 minutes face-to-face)  [] EVAL (MOD) 79110 (typically 30 minutes face-to-face)  [] EVAL (HIGH) 58323 (typically 45 minutes face-to-face)  [] RE-EVAL     [x] PV(97041) x  1   [] IONTO  [x] NMR (83163) x  1   [] VASO  [x] Manual (99568) x  1    [] Other:  [] TA x       [] Mech Traction (51513)  [] ES(attended) (32468)      [] ES (un) (09253):     GOALS:  Patient stated goal: To be able to wake and exercise without pain    Therapist goals for Patient:   Short Term Goals: To be achieved in: 2 weeks  1. Independent in HEP and progression per patient tolerance, in order to prevent re-injury. 2. Patient will have a decrease in pain to facilitate improvement in movement, function, and ADLs as indicated by Functional Deficits. Long Term Goals: To be achieved in: 6 weeks  1. Disability index score of 10% or less for the [place] to assist with reaching prior level of function. 2. Patient will demonstrate increased AROM to WNL, good LS mobility, good hip ROM to allow for proper joint functioning as indicated by patients Functional Deficits.    3. Patient

## 2017-12-01 ENCOUNTER — HOSPITAL ENCOUNTER (OUTPATIENT)
Dept: PHYSICAL THERAPY | Age: 32
Discharge: HOME | End: 2017-12-01
Admitting: PHYSICIAN ASSISTANT

## 2017-12-01 ENCOUNTER — HOSPITAL ENCOUNTER (OUTPATIENT)
Dept: PHYSICAL THERAPY | Age: 32
Discharge: HOME | End: 2017-12-31
Attending: PHYSICIAN ASSISTANT | Admitting: PHYSICIAN ASSISTANT

## 2017-12-01 NOTE — FLOWSHEET NOTE
Reviewed/Progressed HEP activities related to improving balance, coordination, kinesthetic sense, posture, motor skill, proprioception of scapular, scapulothoracic and UE control with self care, reaching, carrying, lifting, house/yardwork, driving/computer work      Manual Treatments:  PROM / STM / Oscillations-Mobs:  G-I, II, III, IV (PA's, Inf., Post.)  [x] (13536) Provided manual therapy to mobilize soft tissue/joints of cervical/CT, scapular GHJ and UE for the purpose of modulating pain, promoting relaxation,  increasing ROM, reducing/eliminating soft tissue swelling/inflammation/restriction, improving soft tissue extensibility and allowing for proper ROM for normal function with self care, reaching, carrying, lifting, house/yardwork, driving/computer work    Modalities:   no time MHP x8'; lumbar traction 10' 100/50# 45/15    Charges:  Timed Code Treatment Minutes: 40   Total Treatment Minutes: 40     [] EVAL (LOW) 23410 (typically 20 minutes face-to-face)  [] EVAL (MOD) 01470 (typically 30 minutes face-to-face)  [] EVAL (HIGH) 45642 (typically 45 minutes face-to-face)  [] RE-EVAL     [x] JM(80905) x  1   [] IONTO  [] NMR (14458) x      [] VASO  [x] Manual (66966) x  1    [] Other:  [] TA x       [x] Mech Traction (97422)  [] ES(attended) (54825)      [] ES (un) (76083):     GOALS:  Patient stated goal: To be able to wake and exercise without pain    Therapist goals for Patient:   Short Term Goals: To be achieved in: 2 weeks  1. Independent in HEP and progression per patient tolerance, in order to prevent re-injury. 2. Patient will have a decrease in pain to facilitate improvement in movement, function, and ADLs as indicated by Functional Deficits. Long Term Goals: To be achieved in: 6 weeks  1. Disability index score of 10% or less for the [place] to assist with reaching prior level of function.    2. Patient will demonstrate increased AROM to WNL, good LS mobility, good hip ROM to allow for proper joint

## 2017-12-05 ENCOUNTER — OFFICE VISIT (OUTPATIENT)
Dept: FAMILY MEDICINE CLINIC | Age: 32
End: 2017-12-05

## 2017-12-05 VITALS
DIASTOLIC BLOOD PRESSURE: 84 MMHG | HEART RATE: 68 BPM | HEIGHT: 67 IN | SYSTOLIC BLOOD PRESSURE: 118 MMHG | TEMPERATURE: 98 F | BODY MASS INDEX: 31.83 KG/M2 | WEIGHT: 202.8 LBS

## 2017-12-05 DIAGNOSIS — M51.36 DDD (DEGENERATIVE DISC DISEASE), LUMBAR: ICD-10-CM

## 2017-12-05 DIAGNOSIS — Z01.818 PREOP EXAMINATION: Primary | ICD-10-CM

## 2017-12-05 PROBLEM — M51.369 DDD (DEGENERATIVE DISC DISEASE), LUMBAR: Status: ACTIVE | Noted: 2017-12-05

## 2017-12-05 PROCEDURE — 99242 OFF/OP CONSLTJ NEW/EST SF 20: CPT | Performed by: FAMILY MEDICINE

## 2017-12-05 ASSESSMENT — ENCOUNTER SYMPTOMS
EYE REDNESS: 0
ABDOMINAL PAIN: 0
COUGH: 0
EYE PAIN: 0
RHINORRHEA: 0
BACK PAIN: 1
DIARRHEA: 0
COLOR CHANGE: 0
VOMITING: 0
CHEST TIGHTNESS: 0
EYE DISCHARGE: 0
BLOOD IN STOOL: 0
CONSTIPATION: 0
WHEEZING: 0
SHORTNESS OF BREATH: 0
SINUS PRESSURE: 0

## 2017-12-05 NOTE — PROGRESS NOTES
Subjective:      Patient ID: Nilson Rios is a 33 y.o. female. HPI  Chief Complaint   Patient presents with    Pre-op Exam     Lumbar surgery  w/Dr. Lilly Slater. Fax:[phone]. Here for preoperative examination for lumbar disectomy on 17. Non smoker. Denies any problems with anesthesia. No history of blood clots  Denies CP,SOB, MORALES, syncope  Nilson Rios is a 33 y.o. female with the following history as recorded in EpicCare:  Patient Active Problem List    Diagnosis Date Noted    Sacroiliitis ([facility] [address] 2017    Low back pain 2017    Left leg pain 2017     (spontaneous vaginal delivery) 2017    Pregnant state, incidental 2013    Routine general medical examination at a health care facility 2013     Current Outpatient Prescriptions   Medication Sig Dispense Refill    Docusate Sodium (COLACE PO) Take by mouth daily      diclofenac (VOLTAREN) 75 MG EC tablet Take 1 tablet by mouth 2 times daily 180 tablet 3    norethindrone (JEN) 0.35 MG tablet take 1 tablet by oral route once daily for 30 days      Prenatal MV-Min-Fe Fum-FA-DHA (PRENATAL 1 PO) Take by mouth       No current facility-administered medications for this visit. Allergies: Review of patient's allergies indicates no known allergies. No past medical history on file.   Past Surgical History:   Procedure Laterality Date    RHINOPLASTY  3/2010    RHINOPLASTY  2010    WISDOM TOOTH EXTRACTION  2004    WISDOM TOOTH EXTRACTION       Family History   Problem Relation Age of Onset    Diabetes Maternal Grandmother     Cancer Maternal Grandfather      bladder    Cancer Mother      sercoma     Allergy (Severe) Mother     Arthritis Mother     Allergy (Severe) Sister     Asthma Brother     Learning Disabilities Maternal Cousin      Social History   Substance Use Topics    Smoking status: Never Smoker    Smokeless tobacco: Never Used    Alcohol use No       Review of Systems

## 2017-12-05 NOTE — ADDENDUM NOTE
Encounter addended by: Afia Rodriguez MA on: 12/5/2017 12:22 PM<BR>    Actions taken: Letter status changed

## 2017-12-06 ENCOUNTER — TELEPHONE (OUTPATIENT)
Dept: ORTHOPEDIC SURGERY | Age: 32
End: 2017-12-06

## 2017-12-06 NOTE — TELEPHONE ENCOUNTER
CPT  84107  DX  M51.16  OP SX  AUTH  NPR -  IF THIS NEEDS PRECERT THE HOSPITAL WILL DO IT  MICROLUMBAR DISCECTOMY  L4 - L5   [facility]  WITH [facility] - [address] [facility]

## 2017-12-20 ENCOUNTER — PAT TELEPHONE (OUTPATIENT)
Dept: PREADMISSION TESTING | Age: 32
End: 2017-12-20

## 2017-12-20 VITALS — HEIGHT: 68 IN | BODY MASS INDEX: 31.83 KG/M2 | WEIGHT: 210 LBS

## 2017-12-20 ASSESSMENT — PAIN SCALES - GENERAL: PAINLEVEL_OUTOF10: 4

## 2017-12-20 ASSESSMENT — PAIN DESCRIPTION - LOCATION: LOCATION: BACK

## 2017-12-20 ASSESSMENT — PAIN DESCRIPTION - PAIN TYPE: TYPE: CHRONIC PAIN

## 2017-12-20 ASSESSMENT — PAIN - FUNCTIONAL ASSESSMENT: PAIN_FUNCTIONAL_ASSESSMENT: 0-10

## 2017-12-20 NOTE — PROGRESS NOTES
Pt states she will bring her breast pump in and would like to pump as soon as she can after surgery. I called fe Alcantar RN to report.    Pt. Understands to ask for assistance and her  will be holding onto the breast pump for her

## 2017-12-20 NOTE — PRE-PROCEDURE INSTRUCTIONS
[address] [facility] time_____930_______        Surgery time____________    Take the following medications with a sip of water: BC pill    Do not eat or drink anything after 12:00 midnight prior to your surgery. This includes water chewing gum, mints and ice chips. You may brush your teeth and gargle the morning of your surgery, but do not swallow the water     Please see your family doctor/pediatrician for a history and physical and/or concerning medications. Bring any test results/reports from your physicians office. If you are under the care of a heart doctor or specialist doctor, please be aware that you may be asked to them for clearance    You may be asked to stop blood thinners such as Coumadin, Plavix, Fragmin, Lovenox, etc., or any anti-inflammatories such as:  Aspirin, Ibuprofen, Advil, Naproxen prior to your surgery. We also ask that you stop any OTC medications such as fish oil, vitamin E, glucosamine, garlic, Multivitamins, COQ 10, etc.    We ask that you do not smoke 24 hours prior to surgery  We ask that you do not  drink any alcoholic beverages 24 hours prior to surgery     You must make arrangements for a responsible adult to take you home after your surgery. For your safety you will not be allowed to leave alone or drive yourself home. Your surgery will be cancelled if you do not have a ride home. Also for your safety, it is strongly suggested that someone stay with you the first 24 hours after your surgery. A parent or legal guardian must accompany a child scheduled for surgery and plan to stay at the hospital until the child is discharged. Please do not bring other children with you. For your comfort, please wear simple loose fitting clothing to the hospital.  Please do not bring valuables.     Do not wear any make-up or nail polish on your fingers or toes      For your safety, please do not wear any jewelry or body piercing's on the day of surgery. All jewelry must be removed. If you have dentures, they will be removed before going to operating room. For your convenience, we will provide you with a container. If you wear contact lenses or glasses, they will be removed, please bring a case for them. If you have a living will and a durable power of  for healthcare, please bring in a copy. As part of our patient safety program to minimize surgical site infections, we ask you to do the following:    · Please notify your surgeon if you develop any illness between         now and the  day of your surgery. · This includes a cough, cold, fever, sore throat, nausea,         or vomiting, and diarrhea, etc.  ·  Please notify your surgeon if you experience dizziness, shortness         of breath or blurred vision between now and the time of your surgery. Do not shave your operative site 96 hours prior to surgery. For face and neck surgery, men may use an electric razor 48 hours   prior to surgery. You may shower the night before surgery or the morning of   your surgery with an antibacterial soap. You will need to bring a photo ID and insurance card    [facility] has an onsite pharmacy, would you like to utilize our pharmacy     If you will be staying overnight and use a C-pap machine, please bring   your C-pap to hospital     Our goal is to provide you with excellent care, therefore, visitors will be limited to two(2) in the room at a time so that we may focus on providing this care for you. Please contact pre-admission testing if you have any further questions. [facility] phone number:  [phone] PAT fax number:  [phone]  Please note these are generalized instructions for all surgical cases, you may be provided with more specific instructions according to your surgery.

## 2017-12-22 ENCOUNTER — HOSPITAL ENCOUNTER (OUTPATIENT)
Dept: SURGERY | Age: 32
Discharge: HOME | End: 2017-12-22
Attending: ORTHOPAEDIC SURGERY | Admitting: ORTHOPAEDIC SURGERY

## 2017-12-22 VITALS
BODY MASS INDEX: 31.83 KG/M2 | OXYGEN SATURATION: 98 % | SYSTOLIC BLOOD PRESSURE: 106 MMHG | RESPIRATION RATE: 14 BRPM | HEART RATE: 70 BPM | DIASTOLIC BLOOD PRESSURE: 70 MMHG | WEIGHT: 210 LBS | HEIGHT: 68 IN | TEMPERATURE: 98 F

## 2017-12-22 LAB
ANION GAP SERPL CALCULATED.3IONS-SCNC: 12 MMOL/L (ref 3–16)
BASOPHILS ABSOLUTE: 0 K/UL (ref 0–0.2)
BASOPHILS RELATIVE PERCENT: 0.7 %
BUN BLDV-MCNC: 10 MG/DL (ref 7–20)
CALCIUM SERPL-MCNC: 9.1 MG/DL (ref 8.3–10.6)
CHLORIDE BLD-SCNC: 107 MMOL/L (ref 99–110)
CO2: 26 MMOL/L (ref 21–32)
CREAT SERPL-MCNC: 0.6 MG/DL (ref 0.6–1.1)
EOSINOPHILS ABSOLUTE: 0.1 K/UL (ref 0–0.6)
EOSINOPHILS RELATIVE PERCENT: 1 %
GFR AFRICAN AMERICAN: >60
GFR NON-AFRICAN AMERICAN: >60
GLUCOSE BLD-MCNC: 86 MG/DL (ref 70–99)
HCT VFR BLD CALC: 40.2 % (ref 36–48)
HEMOGLOBIN: 13.6 G/DL (ref 12–16)
LYMPHOCYTES ABSOLUTE: 2 K/UL (ref 1–5.1)
LYMPHOCYTES RELATIVE PERCENT: 32.8 %
MCH RBC QN AUTO: 29.1 PG (ref 26–34)
MCHC RBC AUTO-ENTMCNC: 33.7 G/DL (ref 31–36)
MCV RBC AUTO: 86.5 FL (ref 80–100)
MONOCYTES ABSOLUTE: 0.4 K/UL (ref 0–1.3)
MONOCYTES RELATIVE PERCENT: 6.5 %
NEUTROPHILS ABSOLUTE: 3.6 K/UL (ref 1.7–7.7)
NEUTROPHILS RELATIVE PERCENT: 59 %
PDW BLD-RTO: 12.4 % (ref 12.4–15.4)
PLATELET # BLD: 190 K/UL (ref 135–450)
PMV BLD AUTO: 6.9 FL (ref 5–10.5)
POTASSIUM SERPL-SCNC: 4.1 MMOL/L (ref 3.5–5.1)
PREGNANCY, URINE: NEGATIVE
RBC # BLD: 4.65 M/UL (ref 4–5.2)
SODIUM BLD-SCNC: 145 MMOL/L (ref 136–145)
WBC # BLD: 6.2 K/UL (ref 4–11)

## 2017-12-22 RX ORDER — MORPHINE SULFATE 2 MG/ML
1 INJECTION, SOLUTION INTRAMUSCULAR; INTRAVENOUS EVERY 5 MIN PRN
Status: DISCONTINUED | OUTPATIENT
Start: 2017-12-22 | End: 2017-12-23 | Stop reason: HOSPADM

## 2017-12-22 RX ORDER — FENTANYL CITRATE 50 UG/ML
50 INJECTION, SOLUTION INTRAMUSCULAR; INTRAVENOUS EVERY 5 MIN PRN
Status: DISCONTINUED | OUTPATIENT
Start: 2017-12-22 | End: 2017-12-23 | Stop reason: HOSPADM

## 2017-12-22 RX ORDER — OXYCODONE HYDROCHLORIDE AND ACETAMINOPHEN 5; 325 MG/1; MG/1
TABLET ORAL
Qty: 60 TABLET | Refills: 0 | Status: SHIPPED | OUTPATIENT
Start: 2017-12-22 | End: 2018-04-05 | Stop reason: ALTCHOICE

## 2017-12-22 RX ORDER — MORPHINE SULFATE 2 MG/ML
2 INJECTION, SOLUTION INTRAMUSCULAR; INTRAVENOUS EVERY 5 MIN PRN
Status: DISCONTINUED | OUTPATIENT
Start: 2017-12-22 | End: 2017-12-23 | Stop reason: HOSPADM

## 2017-12-22 RX ORDER — OXYCODONE HYDROCHLORIDE AND ACETAMINOPHEN 5; 325 MG/1; MG/1
1 TABLET ORAL PRN
Status: ACTIVE | OUTPATIENT
Start: 2017-12-22 | End: 2017-12-22

## 2017-12-22 RX ORDER — FENTANYL CITRATE 50 UG/ML
25 INJECTION, SOLUTION INTRAMUSCULAR; INTRAVENOUS EVERY 5 MIN PRN
Status: DISCONTINUED | OUTPATIENT
Start: 2017-12-22 | End: 2017-12-23 | Stop reason: HOSPADM

## 2017-12-22 RX ORDER — SODIUM CHLORIDE 9 MG/ML
INJECTION, SOLUTION INTRAVENOUS CONTINUOUS
Status: DISCONTINUED | OUTPATIENT
Start: 2017-12-22 | End: 2017-12-23 | Stop reason: HOSPADM

## 2017-12-22 RX ORDER — MEPERIDINE HYDROCHLORIDE 25 MG/ML
12.5 INJECTION INTRAMUSCULAR; INTRAVENOUS; SUBCUTANEOUS EVERY 5 MIN PRN
Status: DISCONTINUED | OUTPATIENT
Start: 2017-12-22 | End: 2017-12-23 | Stop reason: HOSPADM

## 2017-12-22 RX ORDER — SODIUM CHLORIDE 0.9 % (FLUSH) 0.9 %
10 SYRINGE (ML) INJECTION PRN
Status: DISCONTINUED | OUTPATIENT
Start: 2017-12-22 | End: 2017-12-23 | Stop reason: HOSPADM

## 2017-12-22 RX ORDER — ONDANSETRON 2 MG/ML
4 INJECTION INTRAMUSCULAR; INTRAVENOUS
Status: ACTIVE | OUTPATIENT
Start: 2017-12-22 | End: 2017-12-22

## 2017-12-22 RX ORDER — ACETAMINOPHEN 10 MG/ML
1000 INJECTION, SOLUTION INTRAVENOUS ONCE
Status: COMPLETED | OUTPATIENT
Start: 2017-12-22 | End: 2017-12-22

## 2017-12-22 RX ORDER — OXYCODONE HYDROCHLORIDE AND ACETAMINOPHEN 5; 325 MG/1; MG/1
2 TABLET ORAL PRN
Status: ACTIVE | OUTPATIENT
Start: 2017-12-22 | End: 2017-12-22

## 2017-12-22 RX ORDER — SODIUM CHLORIDE 0.9 % (FLUSH) 0.9 %
10 SYRINGE (ML) INJECTION EVERY 12 HOURS SCHEDULED
Status: DISCONTINUED | OUTPATIENT
Start: 2017-12-22 | End: 2017-12-23 | Stop reason: HOSPADM

## 2017-12-22 RX ADMIN — SODIUM CHLORIDE: 9 INJECTION, SOLUTION INTRAVENOUS at 10:30

## 2017-12-22 RX ADMIN — SODIUM CHLORIDE: 9 INJECTION, SOLUTION INTRAVENOUS at 12:40

## 2017-12-22 RX ADMIN — ACETAMINOPHEN 1000 MG: 10 INJECTION, SOLUTION INTRAVENOUS at 10:46

## 2017-12-22 ASSESSMENT — PAIN - FUNCTIONAL ASSESSMENT: PAIN_FUNCTIONAL_ASSESSMENT: 0-10

## 2017-12-22 ASSESSMENT — PAIN SCALES - GENERAL
PAINLEVEL_OUTOF10: 0
PAINLEVEL_OUTOF10: 4
PAINLEVEL_OUTOF10: 0
PAINLEVEL_OUTOF10: 0

## 2017-12-22 ASSESSMENT — PAIN DESCRIPTION - PAIN TYPE
TYPE: SURGICAL PAIN
TYPE: SURGICAL PAIN

## 2017-12-22 NOTE — ANESTHESIA PRE-OP
take 1 tablet by oral route once daily for 30 days      Prenatal MV-Min-Fe Fum-FA-DHA (PRENATAL 1 PO) Take by mouth every morning        Current Facility-Administered Medications   Medication Dose Route Frequency Provider Last Rate Last Dose    0.9 % sodium chloride infusion   Intravenous Continuous Myesha Germain MD        sodium chloride flush 0.9 % injection 10 mL  10 mL Intravenous 2 times per day Myesha Germain MD        sodium chloride flush 0.9 % injection 10 mL  10 mL Intravenous PRN Myesha Germain MD        ceFAZolin (ANCEF) 2 g in sterile water 20 mL IV syringe  2 g Intravenous Once Kim Langley MD        NONFORMULARY 1,000 mg  1,000 mg IVPB OP Once Kim Langley MD         Vital Signs (Current) There were no vitals filed for this visit. Vital Signs Statistics (for past 48 hrs)     No Data Recorded    BP Readings from Last 3 Encounters:   12/05/17 118/84   11/13/17 112/74   10/16/17 115/72     BMI  There is no height or weight on file to calculate BMI. Estimated body mass index is 31.93 kg/m² as calculated from the following:    Height as of 12/20/17: 5' 8\" (1.727 m). Weight as of 12/20/17: 210 lb (95.3 kg). CBC   Lab Results   Component Value Date    WBC 7.4 09/15/2017    RBC 4.46 09/15/2017    HGB 13.4 09/15/2017    HCT 38.6 09/15/2017    MCV 86.6 09/15/2017    RDW 12.8 09/15/2017     09/15/2017     CMP    Lab Results   Component Value Date    GLUCOSE 96 04/15/2016     BMP    Lab Results   Component Value Date    GLUCOSE 96 04/15/2016     POCGlucose  No results for input(s): GLUCOSE in the last 72 hours.    Coags  No results found for: PROTIME, INR, APTT  HCG (If Applicable)   Lab Results   Component Value Date    PREGTESTUR Negative 10/03/2017      ABGs No results found for: PHART, PO2ART, JKO1WLD, UHL3ZFF, BEART, K1XOBAPS   Type & Screen (If Applicable)  No results found for: LABABO, [address]    Surgeon:    Procedure:    Medications prior to admission:   Prior to CRNA.          This pre-anesthesia assessment may be used as a history and physical.    DOS STAFF ADDENDUM:    Pt seen and examined, chart reviewed (including anesthesia, drug and allergy history). No interval changes to history and physical examination. Anesthetic plan, risks, benefits, alternatives, and personnel involved discussed with patient. Patient verbalized an understanding and agrees to proceed.       Mariano Duff MD  December 22, 2017  9:54 AM      Mariano Duff MD   12/22/2017

## 2017-12-22 NOTE — LETTER
[facility]  SURGERY FEE TICKET FOR Devyn Rahman:      DEMOGRAPHICS:                                                                                                              .    Patient Name:  Caroline Green  Patient :  1985   Patient SS#:  [id number]    Patient Phone:  [phone] (home)  Alt.  Patient Phone:    Patient Address:  [place] [id number]    PCP:  Tae RODRIGUEZ DO  Insurance:  Payor: [employer] / Plan: [address] [place], [place] [address]e 75 EMP / Product Type: *No Product type* /   Insurance ID Number:    DIAGNOSIS & PROCEDURE:                                                                                            .    Diagnosis:   Lumbar HNP    Operation:  Microlumbar discectomy L4-5    Location:  [facility]; outpatient    Date of Surgery:  17    Surgeon Assisted:  Naomi Horton PA-C    17     BILLING INFORMATION:                                                                                                    .    Procedure:       CPT Code Modifier

## 2017-12-22 NOTE — ANESTHESIA POST-OP
[facility] Department of Anesthesiology  Post-Anesthesia Note       Name:  Maisha Ellis                                         Age:  33 y.o.   MRN:  [id number]     Last Vitals & Oxygen Saturation: BP 97/64   Pulse 72   Temp 98 °F (36.7 °C) (Temporal)   Resp 12   Ht 5' 8\" (1.727 m)   Wt 210 lb (95.3 kg)   LMP 08/20/2016   SpO2 99%   BMI 31.93 kg/m²   Patient Vitals for the past 4 hrs:   BP Temp Temp src Pulse Resp SpO2   12/22/17 1320 97/64 98 °F (36.7 °C) Temporal 72 12 99 %   12/22/17 1302 (!) 99/48 - - 83 - 100 %   12/22/17 1253 109/60 - - 85 13 98 %   12/22/17 1248 - - - - - 99 %   12/22/17 1245 94/63 - - 68 12 100 %   12/22/17 1241 - - - - - 100 %   12/22/17 1235 (!) 91/50 - - 68 12 -   12/22/17 1230 (!) 89/50 - - 68 11 100 %   12/22/17 1225 (!) 82/48 - - 72 12 -   12/22/17 1220 - - - 76 11 -   12/22/17 1219 (!) 82/48 97.9 °F (36.6 °C) Temporal 71 12 99 %       Level of consciousness: awake, alert and oriented    Respiratory: stable     Cardiovascular: stable     Hydration: stable     PONV: stable     Post-op pain: adequate analgesia    Post-op assessment: no apparent anesthetic complications and tolerated procedure well    Complications:  none    Praveena Goldberg MD  December 22, 2017   2:40 PM

## 2017-12-22 NOTE — OP NOTE
and removed with the 3 mm punch laterally, exposing the lateral dural tube and takeoff of the L5 nerve root. The L5 nerve root was noted to be dorsally displaced. I gently retracted the nerve root medially and found a subligamentous disk herniation directly on the takeoff of the nerve roots. An incision was made in the ligamentous tissue and the fragment immediately presented itself for removal.  The pituitary forceps were used to further explore the interspace and additional loose fragments were removed. The wound was copiously irrigated with antibiotic solution. 0.5% Marcaine in subcutaneous tissues for analgesia. The fascia was then reapproximated using interrupted 0 Vicryl sutures and interrupted 3-0 Vicryl sutures followed by a 4-0 Vicryl subcuticular suture were used to reapproximate the subcuticular layer. A sterile dressing was then applied. The patient was extubated in the operating room and transferred to the recovery room in stable condition. There were no complications. Long Verdugo M.D.

## 2017-12-22 NOTE — H&P
I have reviewed the history and physical and examined the patient and find no relevant changes. I have reviewed with the patient and/or family the risks, benefits, and alternatives to the procedure. Immanuel Mcneal MD  12/22/2017 No intake/output data recorded.

## 2018-01-08 ENCOUNTER — OFFICE VISIT (OUTPATIENT)
Dept: ORTHOPEDIC SURGERY | Age: 33
End: 2018-01-08

## 2018-01-08 VITALS — HEIGHT: 68 IN | WEIGHT: 210.1 LBS | BODY MASS INDEX: 31.84 KG/M2

## 2018-01-08 DIAGNOSIS — M51.16 HERNIATION OF LUMBAR INTERVERTEBRAL DISC WITH RADICULOPATHY: Primary | ICD-10-CM

## 2018-01-08 PROCEDURE — 99024 POSTOP FOLLOW-UP VISIT: CPT | Performed by: ORTHOPAEDIC SURGERY

## 2018-01-15 DIAGNOSIS — Z98.890 S/P DISKECTOMY: Primary | ICD-10-CM

## 2018-01-16 ENCOUNTER — HOSPITAL ENCOUNTER (OUTPATIENT)
Dept: OTHER | Age: 33
Discharge: HOME | End: 2018-01-31
Attending: PHYSICIAN ASSISTANT | Admitting: PHYSICIAN ASSISTANT

## 2018-01-16 ASSESSMENT — PAIN DESCRIPTION - PROGRESSION: CLINICAL_PROGRESSION: GRADUALLY IMPROVING

## 2018-01-16 ASSESSMENT — PAIN DESCRIPTION - FREQUENCY: FREQUENCY: INTERMITTENT

## 2018-01-16 ASSESSMENT — PAIN DESCRIPTION - LOCATION: LOCATION: BACK;BUTTOCKS

## 2018-01-16 ASSESSMENT — PAIN DESCRIPTION - PAIN TYPE: TYPE: SURGICAL PAIN

## 2018-01-16 ASSESSMENT — PAIN SCALES - GENERAL: PAINLEVEL_OUTOF10: 2

## 2018-01-16 ASSESSMENT — PAIN DESCRIPTION - ORIENTATION: ORIENTATION: LEFT

## 2018-01-16 ASSESSMENT — PAIN DESCRIPTION - DESCRIPTORS: DESCRIPTORS: BURNING;ACHING

## 2018-01-16 NOTE — FLOWSHEET NOTE
Physical Therapy Aquatic Flow Sheet   Date:  2018    Patient Name:  Musa Kevin    :  1985    Restrictions/Precautions:  No bending/twisting x ~12 weeks  Pertinent Medical History:     Diagnosis:  S/p microdiskectomy (17)  Treatment Diagnosis:  Dec function, core strength and lumbar ROM, inc pain    Insurance/Certification information: Medical [place]  Referring Physician: Dr Corinne Lancaster of care signed (Y/N):      Visit# / total visits:    Pain level: 2-3/10       G-Code (if applicable):      Date G-Code Applied:    Oswestry score: 21    History of Injury:  Pt began having radicular symptoms when pregnant with 2nd child. Went throughout pregnancy with symptoms then had MRI after delivery; had HNP at L4-5. Tried epidurals, therapy and traction with no help.  MD stated the annulus had a 12 mm tear that kept seeping nucleus    Subjective:  Reports LBP with intermittent L LE (Lat calf) rad s/s    Objective:  Observation:  See eval  Test measurements:      Key  B= Belt DB= Dumbells T= Theratube   G=Gloves N= Noodles W= Weights   P= Paddles WW=Water Walker K= Kickboard        Transfers:   steps  (Ind)      % Immersion:  75            Ambulation:  Laps Exercises UE:      Forwards  3 Shoulder Shrugs     Lateral  Shoulder circles  10    Marching    Scapular Retraction  10    Retro   Rolling  10    Cariocas  Push Downs  10     IR/ER  10     Punching     Stretching:  Rowing  10   Gastroc/Soleus  0p33kqy Elbow Flex/Ext  10   Hamstring  1u48vkn Shldr Flex/Ext     Knee flex stretch   Shldr Abd/Add    Piriformis   Horiz Abd/Add     Hip flexor    Arm Circles     SKTC   PNF Diagonals    DKTC  UE oscillations f/b, s/s    ITB   Wall Push-ups    Quad  Figure 8's    Mid back   Buoy pull/push downs    UT  Tband rows    Rhom  Tband lats    Post Shoulder  Lumbar Rot w/ paddles    Pec   Small ball pull downs                   diagonals             Cervical:       AROM Flex       AROM Extension     LEs exercises: 4950

## 2018-01-16 NOTE — PROGRESS NOTES
Therapeutic Intervention  Body structures, Functions, Activity limitations: Decreased functional mobility ; Decreased ADL status; Decreased ROM; Decreased strength  Assessment: PLOF: never had back issues  Patient has symptomsconsistent with stauts post back surgery  Treatment Diagnosis: Dec function, core strength and lumbar ROM, inc pain  Prognosis: Good  Decision Making: Low Complexity  History: Patient began having radicular symptoms when pregnant with 2nd child, went rhtoughout pregancy with symptoms then had MRI after delivery had HNP at L4-5. Tried epidurals, therapy and traction no help. MD stated the annulus had a 12 mm tear that kept seeping nucleus. Exam: see objective data  Clinical Presentation: stable, improving since surgery  Patient Education: TA set with Kegel's  Barriers to Learning: none  REQUIRES PT FOLLOW UP: Yes  Treatment Initiated : kegels and HSS supine or seated  Activity Tolerance  Activity Tolerance: Patient Tolerated treatment well         Plan   Plan  Times per week: 2  Plan weeks: 6 weeks  Current Treatment Recommendations: Strengthening, ROM, Functional Mobility Training, Home Exercise Program (aquatics)    G-Code   8981 CK  8982 CJ    OutComes Score  Oswestry CMS Modifier: CK  Oswestry Disability Scores %: 42    Goals  Short term goals  Time Frame for Short term goals: 3 weeks  Short term goal 1: Decrease pain in the water to 0/10  Short term goal 2: patient tolerated 50 minutes of water therapy  Short term goal 3: patient able to perform TA set  and hold x 3-5 sec  Long term goals  Time Frame for Long term goals : 6 weeks  Long term goal 1: Decrease pain to 0/10 on land   Long term goal 2: Patient independent with water exercises  Long term goal 3: Patient ready to transition to land therapy  Patient Goals   Patient goals : \"pain free, get off NSAIDS, 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   Exercise, no pain in leg\"       Therapy Time   Individual Concurrent Group Co-treatment   Time In  1100         Time Out [address]         Minutes  [address]

## 2018-01-18 ENCOUNTER — HOSPITAL ENCOUNTER (OUTPATIENT)
Dept: PHYSICAL THERAPY | Age: 33
Discharge: HOME | End: 2018-01-19
Admitting: PHYSICIAN ASSISTANT

## 2018-01-18 NOTE — FLOWSHEET NOTE
Cervical:       AROM Flex       AROM Extension     LEs exercises:   AROM Side Bending    Marching  10 AROM Rotation    Heel Raises  10 Chin tucks    Toe Raises  10 Chin nods     Squats  10      Hamstring Curls  10      Hip Flexion  10 Balance: Hip Abduction  10 SLS    Hip Circles  10 Tandem stance    TA set  55y3ktp NBOS eyes open    Glut Set  40x5lkj NBOS eyes closed    Hip Extension  Hand to Opposite Knee    Hip Adduction    Box Step     Hip IR   Noodle Stance     Hip ER  Stop/Go Gait    Fig 8's  Switch Gait                Seated:  Functional:    Ankle Pumps  15 Step up forward    Ankle circles  10 Step up lateral    Knee flex & ext  15 Step down    Hip Abd & Adduction  15 [place] squats    Bicycle   15 Crate Lifts    Add Set with ball  10 Lunges forward    LX stab with med ball throws  Lunges lateral    Ankle INV  Lunges retro    Ankle EV  Lower ab curl with noodle      Upper ab curl with ball      Med ball straight lifts      Med ball diagonal lifts      Hydrorider          Noodle:      Leg Press  Deep Water:    Noodle hang at wall  Jog    Noodle hang deep water  Jumping Jacks    Noodle Bicycle  Heel to toe      Hand to opposite knee      Cross country skier      Rocking Horse    Verbal cues for proper posture, exercise technique and exercise without increase pain. Timed Code Treatment Minutes:  35    Total Treatment Minutes:  35    Treatment/Activity Tolerance:  [x] Patient tolerated treatment well [] Patient limited by fatique  [] Patient limited by pain  [] Patient limited by other medical complications  [] Other:     Pain after aquatics:  3/10     Patient Education:  Discussed with pt importance of exercises without increase pain and with proper posture. Pt verbalized understanding.   Plan:   [x] Continue per plan of care [] Alter current plan (see comments)  [] Plan of care initiated [] Hold pending MD visit [] Discharge    Plan for Next Session:  With emphasis on good posture and exercise technique, gradually progress spinal stabilization exercises to increase strength, flexibility and endurance to decrease pain and improve function.   Add: inc forward gt, inc lateral gt, inc seated exer, add noodle balance as tolerated      Electronically signed by: Roel Corey, PTA [id number]

## 2018-01-23 ENCOUNTER — HOSPITAL ENCOUNTER (OUTPATIENT)
Dept: PHYSICAL THERAPY | Age: 33
Discharge: HOME | End: 2018-01-24
Admitting: PHYSICIAN ASSISTANT

## 2018-01-25 ENCOUNTER — HOSPITAL ENCOUNTER (OUTPATIENT)
Dept: PHYSICAL THERAPY | Age: 33
Discharge: HOME | End: 2018-01-26
Admitting: PHYSICIAN ASSISTANT

## 2018-01-25 NOTE — FLOWSHEET NOTE
technique, gradually progress spinal stabilization exercises to increase strength, flexibility and endurance to decrease pain and improve function.   Add:inc leg press, one leg stand, inc marching gt as tolerated      Electronically signed by: Judson Tillman, PTA [id number]

## 2018-01-30 ENCOUNTER — HOSPITAL ENCOUNTER (OUTPATIENT)
Dept: PHYSICAL THERAPY | Age: 33
Discharge: HOME | End: 2018-01-31
Admitting: PHYSICIAN ASSISTANT

## 2018-02-01 ENCOUNTER — HOSPITAL ENCOUNTER (OUTPATIENT)
Dept: OTHER | Age: 33
Discharge: HOME | End: 2018-02-28
Attending: PHYSICIAN ASSISTANT | Admitting: PHYSICIAN ASSISTANT

## 2018-02-01 ENCOUNTER — HOSPITAL ENCOUNTER (OUTPATIENT)
Dept: PHYSICAL THERAPY | Age: 33
Discharge: HOME | End: 2018-02-02
Admitting: PHYSICIAN ASSISTANT

## 2018-02-01 NOTE — FLOWSHEET NOTE
Cervical:       AROM Flex       AROM Extension     LEs exercises:   AROM Side Bending    Marching  10 AROM Rotation    Heel Raises  10 Chin tucks    Toe Raises  10 Chin nods     Squats  10      Hamstring Curls  10      Hip Flexion  10 Balance: Hip Abduction  10 SLS  1 min   Hip Circles  10 x 2 Tandem stance    TA set  25c4awg NBOS eyes open    Glut Set  28b5odu NBOS eyes closed    Hip Extension  Hand to Opposite Knee    Hip Adduction    Box Step     Hip IR   Noodle Stance  30 sec    Hip ER  Stop/Go Gait    Fig 8's  Switch Gait                Seated:  Functional:    Ankle Pumps  30 Step up forward 10 R/L   Ankle circles  10 Step up lateral    Knee flex & ext  30 Step down    Hip Abd & Adduction  30 [place] squats    Bicycle   30 Crate Lifts    Add Set with ball  10 Lunges forward    LX stab with med ball throws  Lunges lateral    Ankle INV  Lunges retro    Ankle EV  Lower ab curl with noodle      Upper ab curl with ball      Med ball straight lifts      Med ball diagonal lifts      Hydrorider          Noodle:      Leg Press 20 R/L Deep Water:    Noodle hang at wall  Jog    Noodle hang deep water  Jumping Jacks    Noodle Bicycle  Heel to toe      Hand to opposite knee      Cross country skier      Rocking Horse    Verbal cues for proper posture, exercise technique and exercise without increase pain.     Timed Code Treatment Minutes:  45    Total Treatment Minutes:  45    Treatment/Activity Tolerance:  [x] Patient tolerated treatment well [] Patient limited by fatique  [] Patient limited by pain  [] Patient limited by other medical complications  [] Other:     Pain after aquatics:  1/10     Patient Education:      Plan:   [x] Continue per plan of care [] Alter current plan (see comments)  [] Plan of care initiated [] Hold pending MD visit [] Discharge    Plan for Next Session:  With emphasis on good posture and exercise technique, gradually progress spinal stabilization exercises to increase strength,

## 2018-02-06 ENCOUNTER — HOSPITAL ENCOUNTER (OUTPATIENT)
Dept: PHYSICAL THERAPY | Age: 33
Discharge: HOME | End: 2018-02-07
Admitting: PHYSICIAN ASSISTANT

## 2018-02-08 ENCOUNTER — HOSPITAL ENCOUNTER (OUTPATIENT)
Dept: PHYSICAL THERAPY | Age: 33
Discharge: HOME | End: 2018-02-09
Admitting: PHYSICIAN ASSISTANT

## 2018-02-13 ENCOUNTER — HOSPITAL ENCOUNTER (OUTPATIENT)
Dept: PHYSICAL THERAPY | Age: 33
Discharge: HOME | End: 2018-02-14
Admitting: PHYSICIAN ASSISTANT

## 2018-02-13 NOTE — FLOWSHEET NOTE
Physical Therapy Aquatic Flow Sheet   Date:  2018    Patient Name:  Wan Verdin    :  1985    Restrictions/Precautions:  No bending/twisting x ~12 weeks  Pertinent Medical History:     Diagnosis:  S/p microdiskectomy (17)  Treatment Diagnosis:  Dec function, core strength and lumbar ROM, inc pain    Insurance/Certification information: Medical [place]  Referring Physician: Dr Kain Delgadillo of care signed (Y/N):      Visit# / total visits:    Pain level: 310       G-Code (if applicable):      Date G-Code Applied:    Oswestry score: 21    History of Injury:  Pt began having radicular symptoms when pregnant with 2nd child. Went throughout pregnancy with symptoms then had MRI after delivery; had HNP at L4-5. Tried epidurals, therapy and traction with no help. MD stated the annulus had a 12 mm tear that kept seeping nucleus    Subjective:  I worked later last night and am a more sore today.     Objective:  Observation:  See eval  Test measurements:      Key  B= Belt DB= Dumbells T= Theratube   G=Gloves N= Noodles W= Weights   P= Paddles WW=Water Walker K= Kickboard        Transfers:   steps  (Ind)      % Immersion:  75            Ambulation:  Laps Exercises UE:  unable to complete due to time    Forwards  6 + 1  Shoulder Shrugs     Lateral 4 Shoulder circles    Marching   2 Scapular Retraction    Retro   Rolling    Cariocas  Push Downs     IR/ER     Punching   Stretching:  Rowing   Gastroc/Soleus  1u14utk Elbow Flex/Ext   Hamstring  1v91bay Shldr Flex/Ext   Knee flex stretch   Shldr Abd/Add   Piriformis   Horiz Abd/Add   Hip flexor    Arm Circles   SKTC   PNF Diagonals    DKTC  UE oscillations f/b, s/s    ITB   Wall Push-ups    Quad  Figure 8's    Mid back   Buoy pull/push downs    UT  Tband rows/ bicep curls Yellow  15/15   Rhom  Tband lats/ tricep ext Yellow  15/15   Post Shoulder  tband punching together Yellow 15   Pec   Small ball pull downs                   diagonals

## 2018-02-15 ENCOUNTER — HOSPITAL ENCOUNTER (OUTPATIENT)
Dept: PHYSICAL THERAPY | Age: 33
Discharge: HOME | End: 2018-02-16
Admitting: PHYSICIAN ASSISTANT

## 2018-02-20 ENCOUNTER — HOSPITAL ENCOUNTER (OUTPATIENT)
Dept: PHYSICAL THERAPY | Age: 33
Discharge: HOME | End: 2018-02-21
Admitting: PHYSICIAN ASSISTANT

## 2018-02-20 NOTE — FLOWSHEET NOTE
Minutes:  15  Total Treatment Minutes:  15    Treatment/Activity Tolerance:  [x] Patient tolerated treatment well [] Patient limited by fatigue  [] Patient limited by pain  [] Patient limited by other medical complications  [] Other:     Prognosis: [x] Good [] Fair  [] Poor    Goals:    Short term goals  Time Frame for Short term goals: 3 weeks  Short term goal 1: Decrease pain in the water to 0/10  Short term goal 2: patient tolerated 50 minutes of water therapy  Short term goal 3: patient able to perform TA set  and hold x 3-5 sec      Long term goals  Time Frame for Long term goals : 6 weeks  Long term goal 1: Decrease pain to 0/10 on land   Long term goal 2: Patient independent with water exercises  Long term goal 3: Patient ready to transition to land therapy       Patient Requires Follow-up: [x] Yes  [] No    Plan:   [] Continue per plan of care [] Alter current plan (see comments)  [x] Plan of care initiated [] Hold pending MD visit [] Discharge    Plan for Next Session: See above    Electronically signed by:   Belle Feranndez, [address] [place] [address]

## 2018-02-27 ENCOUNTER — HOSPITAL ENCOUNTER (OUTPATIENT)
Dept: PHYSICAL THERAPY | Age: 33
Discharge: HOME | End: 2018-02-28
Admitting: PHYSICIAN ASSISTANT

## 2018-02-27 NOTE — FLOWSHEET NOTE
Physical Therapy Aquatic Flow Sheet   Date:  2018    Patient Name:  Calista Trujillo    :  1985    Restrictions/Precautions:  No bending/twisting x ~12 weeks  Pertinent Medical History:     Diagnosis:  S/p microdiskectomy (17)  Treatment Diagnosis:  Dec function, core strength and lumbar ROM, inc pain    Insurance/Certification information: Medical [place]  Referring Physician: Dr Divya Duke of care signed (Y/N):      Visit# / total visits:    Pain level: 1/10       G-Code (if applicable):      Date G-Code Applied:    Oswestry score: 21    History of Injury:  Pt began having radicular symptoms when pregnant with 2nd child. Went throughout pregnancy with symptoms then had MRI after delivery; had HNP at L4-5. Tried epidurals, therapy and traction with no help. MD stated the annulus had a 12 mm tear that kept seeping nucleus    Subjective:  I can sit longer without increase pain.   Objective:  Observation:  See eval  Test measurements:      Key  B= Belt DB= Dumbells T= Theratube   G=Gloves N= Noodles W= Weights   P= Paddles WW=Water Walker K= Kickboard        Transfers:   steps  (Ind)      % Immersion:  75            Ambulation:  Laps Exercises UE:  unable to complete due to time    Forwards  6 + 1  Shoulder Shrugs     Lateral 4 Shoulder circles    Marching   2 Scapular Retraction    Retro   Rolling    Cariocas  Push Downs     IR/ER     Punching   Stretching:  Rowing   Gastroc/Soleus  6p63sgx Elbow Flex/Ext   Hamstring  5z42mna Shldr Flex/Ext   Knee flex stretch   Shldr Abd/Add   Piriformis  2 x 30 sec Horiz Abd/Add   Hip flexor    Arm Circles   SKTC   PNF Diagonals    DKTC  UE oscillations f/b, s/s    ITB   Wall Push-ups    Quad  Figure 8's    Mid back   Buoy pull/push downs    UT  Tband rows/ bicep curls Yellow  20/20   Rhom  Tband lats/ tricep ext Yellow  20/20   Post Shoulder  tband punching together Yellow 20   Pec   Small ball pull downs                   diagonals             Cervical:

## 2018-03-01 ENCOUNTER — HOSPITAL ENCOUNTER (OUTPATIENT)
Dept: PHYSICAL THERAPY | Age: 33
Discharge: HOME | End: 2018-03-02
Admitting: PHYSICIAN ASSISTANT

## 2018-03-01 ENCOUNTER — HOSPITAL ENCOUNTER (OUTPATIENT)
Dept: OTHER | Age: 33
Discharge: HOME | End: 2018-03-31
Attending: PHYSICIAN ASSISTANT | Admitting: PHYSICIAN ASSISTANT

## 2018-03-08 ENCOUNTER — HOSPITAL ENCOUNTER (OUTPATIENT)
Dept: PHYSICAL THERAPY | Age: 33
Discharge: HOME | End: 2018-03-09
Admitting: PHYSICIAN ASSISTANT

## 2018-03-08 NOTE — FLOWSHEET NOTE
Physical Therapy Daily Treatment Note  Date:  3/8/2018    Patient Name:  Jailene Elkins    :  1985  MRN: [id number]    Restrictions/Precautions: Restrictions/Precautions  Restrictions/Precautions: Fall Risk (none)  No Bending or Twisting for 2 more weeks  Medical/Treatment Diagnosis Information:  · Diagnosis: s/p microdiskectomy  · Treatment Diagnosis: Dec function, core strength and lumbar ROM, inc pain    Insurance/Certification information:  PT Insurance Information: Medical [employer]  Physician Information:  Referring Practitioner: Dr. Jane Gonsales of care signed (Y/N):      Visit# / total visits:  15/16  Pain level:  2-3/10     G-Code (if applicable):      Date / Visit # G-Code Applied:  /       Progress Note: []  Yes  [x]  No  Next due by: Visit #10      History of Injury: Patient began having radicular symptoms when pregnant with 2nd child, went rhtoughout pregancy with symptoms then had MRI after delivery had HNP at L4-5. Tried epidurals, therapy and traction no help. MD stated the annulus had a 12 mm tear that kept seeping nucleus    Subjective:   Reports some soreness L side/SI. Gets a little better every week. Less stiff after therapy   Objective:  Observation:   Test measurements:        Exercises:  Exercise/Equipment Resistance/Repetitions Other comments        Nustep L1 x 5 min    Kegels/TA set with    hep   Seated or supine HSS 20 sec x 3 - seated today  hep   Standing hip flexion stretch L 20 se x 3    Wall squats/TA 10 x 5 sec     Swiss Ball  N sit  4 way rolling  Rows  Ext  Seated abd curls   1 min  X 10 each gentle   x10  Red   x10 Red          TA with marching X 10     Piriformis stretch 3 x 20 sec L/R    quadriped opp arm and leg x 5 each side    Bridges x10         STM L piriformis x 8 min  Pt SL   CP after  Ice massage 5 min      Other Therapeutic Activities:  Patient was educated on diagnosis, plan of care and prognosis of their complaint.   Also, frequency and duration of treatments

## 2018-03-13 ENCOUNTER — HOSPITAL ENCOUNTER (OUTPATIENT)
Dept: PHYSICAL THERAPY | Age: 33
Discharge: HOME | End: 2018-03-14
Admitting: PHYSICIAN ASSISTANT

## 2018-03-15 ENCOUNTER — HOSPITAL ENCOUNTER (OUTPATIENT)
Dept: PHYSICAL THERAPY | Age: 33
Discharge: HOME | End: 2018-03-16
Admitting: PHYSICIAN ASSISTANT

## 2018-03-15 NOTE — FLOWSHEET NOTE
frequency and duration of treatments was discussed. Patient was informed of the attendance policy and issued a copy for their records. Home Exercise Program: Patient given home exercise program and demonstrates correct technique. HEP booklet also issued. Timed Code Treatment Minutes: 30  Total Treatment Minutes:  30    Treatment/Activity Tolerance:  [x] Patient tolerated treatment well [] Patient limited by fatigue  [] Patient limited by pain  [] Patient limited by other medical complications  [] Other:     Prognosis: [x] Good [] Fair  [] Poor    Goals:    Short term goals  Time Frame for Short term goals: 3 weeks  Short term goal 1: Decrease pain in the water to 0/10  Short term goal 2: patient tolerated 50 minutes of water therapy  Short term goal 3: patient able to perform TA set  and hold x 3-5 sec      Long term goals  Time Frame for Long term goals : 6 weeks  Long term goal 1: Decrease pain to 0/10 on land   Long term goal 2: Patient independent with water exercises  Long term goal 3: Patient ready to transition to land therapy       Patient Requires Follow-up: [x] Yes  [] No    Plan:   [] Continue per plan of care [] Alter current plan (see comments)  [x] Plan of care initiated [] Hold pending MD visit [] Discharge    Plan for Next Session: See above  Reeval    Electronically signed by:   Jeremy Morales, [id number]

## 2018-03-20 ENCOUNTER — HOSPITAL ENCOUNTER (OUTPATIENT)
Dept: PHYSICAL THERAPY | Age: 33
Discharge: HOME | End: 2018-03-21
Admitting: PHYSICIAN ASSISTANT

## 2018-03-27 ENCOUNTER — HOSPITAL ENCOUNTER (OUTPATIENT)
Dept: PHYSICAL THERAPY | Age: 33
Discharge: HOME | End: 2018-03-28
Admitting: PHYSICIAN ASSISTANT

## 2018-04-01 ENCOUNTER — HOSPITAL ENCOUNTER (OUTPATIENT)
Dept: OTHER | Age: 33
Discharge: HOME | End: 2018-04-27
Attending: PHYSICIAN ASSISTANT | Admitting: PHYSICIAN ASSISTANT

## 2018-04-03 ENCOUNTER — HOSPITAL ENCOUNTER (OUTPATIENT)
Dept: PHYSICAL THERAPY | Age: 33
Discharge: HOME | End: 2018-04-04
Admitting: PHYSICIAN ASSISTANT

## 2018-04-05 ENCOUNTER — HOSPITAL ENCOUNTER (OUTPATIENT)
Dept: PHYSICAL THERAPY | Age: 33
Discharge: HOME | End: 2018-04-06
Admitting: PHYSICIAN ASSISTANT

## 2018-04-05 ENCOUNTER — OFFICE VISIT (OUTPATIENT)
Dept: INTERNAL MEDICINE CLINIC | Age: 33
End: 2018-04-05

## 2018-04-05 VITALS
RESPIRATION RATE: 16 BRPM | TEMPERATURE: 97.8 F | WEIGHT: 205.2 LBS | HEART RATE: 73 BPM | HEIGHT: 68 IN | BODY MASS INDEX: 31.1 KG/M2 | DIASTOLIC BLOOD PRESSURE: 64 MMHG | SYSTOLIC BLOOD PRESSURE: 98 MMHG

## 2018-04-05 DIAGNOSIS — Z13.220 SCREENING CHOLESTEROL LEVEL: ICD-10-CM

## 2018-04-05 DIAGNOSIS — Z00.00 PHYSICAL EXAM, ANNUAL: Primary | ICD-10-CM

## 2018-04-05 DIAGNOSIS — M51.9 LUMBAR DISC DISEASE: ICD-10-CM

## 2018-04-05 PROCEDURE — 99395 PREV VISIT EST AGE 18-39: CPT | Performed by: INTERNAL MEDICINE

## 2018-04-05 ASSESSMENT — PATIENT HEALTH QUESTIONNAIRE - PHQ9
SUM OF ALL RESPONSES TO PHQ9 QUESTIONS 1 & 2: 0
1. LITTLE INTEREST OR PLEASURE IN DOING THINGS: 0
2. FEELING DOWN, DEPRESSED OR HOPELESS: 0
SUM OF ALL RESPONSES TO PHQ QUESTIONS 1-9: 0

## 2018-04-08 ASSESSMENT — ENCOUNTER SYMPTOMS
NAUSEA: 0
ABDOMINAL PAIN: 0
DIARRHEA: 0
COUGH: 0
CONSTIPATION: 0
SHORTNESS OF BREATH: 0
VOMITING: 0
BACK PAIN: 1

## 2018-04-12 PROBLEM — Z01.818 PREOP EXAMINATION: Status: RESOLVED | Noted: 2017-12-05 | Resolved: 2018-04-12

## 2018-04-13 DIAGNOSIS — Z13.220 SCREENING CHOLESTEROL LEVEL: ICD-10-CM

## 2018-04-13 DIAGNOSIS — Z00.00 PHYSICAL EXAM, ANNUAL: ICD-10-CM

## 2018-04-13 LAB
CHOLESTEROL, TOTAL: 142 MG/DL (ref 0–199)
GLUCOSE BLD-MCNC: 90 MG/DL (ref 70–99)
HDLC SERPL-MCNC: 51 MG/DL (ref 40–60)
LDL CHOLESTEROL CALCULATED: 77 MG/DL
TRIGL SERPL-MCNC: 68 MG/DL (ref 0–150)
VLDLC SERPL CALC-MCNC: 14 MG/DL

## 2018-06-18 DIAGNOSIS — Z98.890 S/P LUMBAR DISCECTOMY: Primary | ICD-10-CM

## 2018-06-20 DIAGNOSIS — M54.50 LUMBAR SPINE PAIN: Primary | ICD-10-CM

## 2018-09-13 ENCOUNTER — PAT TELEPHONE (OUTPATIENT)
Dept: PREADMISSION TESTING | Age: 33
End: 2018-09-13

## 2018-09-13 ENCOUNTER — SURG/PROC ORDERS (OUTPATIENT)
Dept: ANESTHESIOLOGY | Age: 33
End: 2018-09-13

## 2018-09-13 VITALS — BODY MASS INDEX: 30.77 KG/M2 | WEIGHT: 203 LBS | HEIGHT: 68 IN

## 2018-09-13 RX ORDER — SODIUM CHLORIDE 0.9 % (FLUSH) 0.9 %
10 SYRINGE (ML) INJECTION PRN
Status: CANCELLED | OUTPATIENT
Start: 2018-09-13 | End: 2019-09-13

## 2018-09-13 RX ORDER — SODIUM CHLORIDE 0.9 % (FLUSH) 0.9 %
10 SYRINGE (ML) INJECTION EVERY 12 HOURS SCHEDULED
Status: CANCELLED | OUTPATIENT
Start: 2018-09-13 | End: 2019-09-13

## 2018-09-13 RX ORDER — SODIUM CHLORIDE 9 MG/ML
INJECTION, SOLUTION INTRAVENOUS CONTINUOUS
Status: CANCELLED | OUTPATIENT
Start: 2018-09-13 | End: 2019-09-13

## 2018-09-13 ASSESSMENT — PAIN SCALES - GENERAL: PAINLEVEL_OUTOF10: 2

## 2018-09-13 ASSESSMENT — PAIN DESCRIPTION - LOCATION: LOCATION: ABDOMEN;BACK

## 2018-09-13 ASSESSMENT — PAIN - FUNCTIONAL ASSESSMENT: PAIN_FUNCTIONAL_ASSESSMENT: 0-10

## 2018-09-13 ASSESSMENT — PAIN DESCRIPTION - DESCRIPTORS: DESCRIPTORS: CRAMPING

## 2018-09-13 ASSESSMENT — PAIN DESCRIPTION - PAIN TYPE: TYPE: ACUTE PAIN

## 2018-09-13 ASSESSMENT — PAIN DESCRIPTION - ORIENTATION: ORIENTATION: LOWER

## 2018-09-13 NOTE — PROGRESS NOTES
C-Difficile admission screening and protocol:     * Admitted with diarrhea? YES____    NO___X_     *Prior history of C-Diff. In last 3 months? YES____   NO_X____     *Antibiotic use in the past 6-8 weeks? NO__X___YES______                 If yes which  ANTIBIOTIC AND REASON______     *Prior hospitalization or nursing home in the last month?  YES____   NO__X_

## 2018-09-14 PROBLEM — O02.1 MISSED ABORTION: Status: ACTIVE | Noted: 2018-09-14

## 2018-09-26 ENCOUNTER — OFFICE VISIT (OUTPATIENT)
Dept: ORTHOPEDIC SURGERY | Age: 33
End: 2018-09-26
Payer: COMMERCIAL

## 2018-09-26 VITALS
SYSTOLIC BLOOD PRESSURE: 106 MMHG | WEIGHT: 210 LBS | HEIGHT: 55 IN | HEART RATE: 84 BPM | DIASTOLIC BLOOD PRESSURE: 56 MMHG | BODY MASS INDEX: 48.6 KG/M2

## 2018-09-26 DIAGNOSIS — M54.5 CHRONIC LEFT-SIDED LOW BACK PAIN, WITH SCIATICA PRESENCE UNSPECIFIED: ICD-10-CM

## 2018-09-26 DIAGNOSIS — M47.816 LUMBAR FACET ARTHROPATHY: Primary | ICD-10-CM

## 2018-09-26 DIAGNOSIS — G89.29 CHRONIC LEFT-SIDED LOW BACK PAIN, WITH SCIATICA PRESENCE UNSPECIFIED: ICD-10-CM

## 2018-09-26 PROCEDURE — 99214 OFFICE O/P EST MOD 30 MIN: CPT | Performed by: PHYSICIAN ASSISTANT

## 2018-09-26 RX ORDER — MISOPROSTOL 200 UG/1
TABLET ORAL
Status: ON HOLD | COMMUNITY
Start: 2018-09-11 | End: 2018-10-02

## 2018-09-26 RX ORDER — METHYLERGONOVINE MALEATE 0.2 MG/1
TABLET ORAL
Status: ON HOLD | COMMUNITY
Start: 2018-09-11 | End: 2018-10-02

## 2018-09-26 NOTE — LETTER
[facility]    Please Schedule the following with: Dr. Jaylyn Maxwell    Date:  18     Patient: Mesha Dougherty     YOB: 1985    Patient Home Phone: [phone] (home)    Diagnosis: Facet arthropathy M12.88 /synovitis M65.9, lumbar DDD M51.36    [x]LT     []RT     []EDEL     []Midline    Levels: L3 L4 L5 #1     []Cervical EVANGELIST 39485, [id number]  [x]L-MBB [id number], [id number]  []SI Joint 94273   []C-FACET 70774, 19143, 26621  []L-FACET [id number], [id number]  []Interlaminar EVANGELIST 69610     []HIP 54029    []C-MBB 57050, [id number], [id number]  []Transforaminal EVANGELIST [id number]  []Neurotomy I751405, [id number], 83566    Attending Physician: Robyn Saeed    Injection Schedule for:      At: [facility]    First Insurance:MED [employer]                                Pre-cert #:  Second Insurance:                 Pre-cert #:    Comments:  No lasting relief  17: Lt L4-5 TX EVANGELIST  8/15/17 Lt L4-5 TX EVANGELIST  10/3/17: Lt L5-S1 LESI      [] Blood Thinner:                 []Diabetic           []Antibiotic:               []Glaucoma:    [] Current Open Wounds, Lacerations or Sores     Allergies: No Known Allergies    Past Medical History:   Diagnosis Date    Contact lens/glasses fitting      (spontaneous vaginal delivery)           Current Outpatient Prescriptions:     misoprostol (CYTOTEC) 200 MCG tablet, take 2 tablets (400 mcg) by vaginal route at 11PM night before surgery, Disp: , Rfl:     methylergonovine (METHERGINE) 0.2 MG tablet, take 1 tablet (0.2 mg) by oral route every 6 hours, Disp: , Rfl:

## 2018-09-26 NOTE — PROGRESS NOTES
pain syndrome   2) L4-5 disc bulging and facet arthropathy   3) S/p L L4-5 MLL w/Dr. Dalton Leonard  w/improved radiculitis         Plan:  1) Discussed Left L3, L4, L5 MBB #1 to assess for facet pain. Procedure risk and benefits discussed.   2) PT for above  3) She will provide pain diary after above            [facility]

## 2018-09-27 ENCOUNTER — TELEPHONE (OUTPATIENT)
Dept: ORTHOPEDIC SURGERY | Age: 33
End: 2018-09-27

## 2018-09-27 NOTE — TELEPHONE ENCOUNTER
DOS   10/02/2018  CPT  24050   80464   DX   M12.88  M65.9   M51.36  OP SX AUTH [employer] FOR THIS PLAN  #  1  OF  12   INJECTIONS ALLOWED VALID 10/2/2018 - 2/28/2018  LEFT  LEVELS L3  L4  L5 PROCEDURE EPIDURAL INJECTION  DR. Stephanie Flanagan

## 2018-10-02 ENCOUNTER — HOSPITAL ENCOUNTER (OUTPATIENT)
Age: 33
Setting detail: OUTPATIENT SURGERY
Discharge: HOME | End: 2018-10-02
Attending: PHYSICAL MEDICINE & REHABILITATION | Admitting: PHYSICAL MEDICINE & REHABILITATION
Payer: COMMERCIAL

## 2018-10-02 VITALS
HEART RATE: 72 BPM | SYSTOLIC BLOOD PRESSURE: 114 MMHG | TEMPERATURE: 98.2 F | BODY MASS INDEX: 30.77 KG/M2 | RESPIRATION RATE: 16 BRPM | OXYGEN SATURATION: 100 % | WEIGHT: 203 LBS | HEIGHT: 68 IN | DIASTOLIC BLOOD PRESSURE: 72 MMHG

## 2018-10-02 LAB — PREGNANCY, URINE: NEGATIVE

## 2018-10-02 PROCEDURE — 3600000002 HC SURGERY LEVEL 2 BASE: Performed by: PHYSICAL MEDICINE & REHABILITATION

## 2018-10-02 PROCEDURE — 2709999900 HC NON-CHARGEABLE SUPPLY: Performed by: PHYSICAL MEDICINE & REHABILITATION

## 2018-10-02 PROCEDURE — 7100000010 HC PHASE II RECOVERY - FIRST 15 MIN: Performed by: PHYSICAL MEDICINE & REHABILITATION

## 2018-10-02 PROCEDURE — 7100000011 HC PHASE II RECOVERY - ADDTL 15 MIN: Performed by: PHYSICAL MEDICINE & REHABILITATION

## 2018-10-02 PROCEDURE — 2500000003 HC RX 250 WO HCPCS: Performed by: PHYSICAL MEDICINE & REHABILITATION

## 2018-10-02 PROCEDURE — 84703 CHORIONIC GONADOTROPIN ASSAY: CPT

## 2018-10-02 RX ORDER — BUPIVACAINE HYDROCHLORIDE 7.5 MG/ML
INJECTION, SOLUTION EPIDURAL; RETROBULBAR PRN
Status: DISCONTINUED | OUTPATIENT
Start: 2018-10-02 | End: 2018-10-02 | Stop reason: HOSPADM

## 2018-10-02 ASSESSMENT — PAIN - FUNCTIONAL ASSESSMENT: PAIN_FUNCTIONAL_ASSESSMENT: 0-10

## 2018-10-02 ASSESSMENT — PAIN DESCRIPTION - PROGRESSION: CLINICAL_PROGRESSION: NOT CHANGED

## 2018-10-02 ASSESSMENT — PAIN DESCRIPTION - DESCRIPTORS: DESCRIPTORS: ACHING

## 2018-10-02 ASSESSMENT — PAIN DESCRIPTION - FREQUENCY: FREQUENCY: CONTINUOUS

## 2018-10-02 ASSESSMENT — PAIN DESCRIPTION - PAIN TYPE: TYPE: CHRONIC PAIN

## 2018-10-02 ASSESSMENT — PAIN SCALES - GENERAL: PAINLEVEL_OUTOF10: 3

## 2018-10-03 ENCOUNTER — HOSPITAL ENCOUNTER (OUTPATIENT)
Dept: PHYSICAL THERAPY | Age: 33
Setting detail: THERAPIES SERIES
Discharge: HOME | End: 2018-10-03
Payer: COMMERCIAL

## 2018-10-03 PROCEDURE — 97140 MANUAL THERAPY 1/> REGIONS: CPT | Performed by: PHYSICAL THERAPIST

## 2018-10-03 PROCEDURE — G8982 BODY POS GOAL STATUS: HCPCS | Performed by: PHYSICAL THERAPIST

## 2018-10-03 PROCEDURE — 97110 THERAPEUTIC EXERCISES: CPT | Performed by: PHYSICAL THERAPIST

## 2018-10-03 PROCEDURE — 97162 PT EVAL MOD COMPLEX 30 MIN: CPT | Performed by: PHYSICAL THERAPIST

## 2018-10-03 PROCEDURE — G8981 BODY POS CURRENT STATUS: HCPCS | Performed by: PHYSICAL THERAPIST

## 2018-10-03 NOTE — PLAN OF CARE
[facility], [address]  Phone: [phone]  Fax [phone]    Physical Therapy Certification    Dear Referring Practitioner: Dr. Hogan Officer,    We had the pleasure of evaluating the following patient for physical therapy services at [address] [place] [address]. A summary of our findings can be found in the initial assessment below. This includes our plan of care. If you have any questions or concerns regarding these findings, please do not hesitate to contact me at the office phone number checked above. Thank you for the referral.       Physician Signature:_______________________________Date:__________________  By signing above (or electronic signature), therapists plan is approved by physician    Patient: Claire Melgoza   : 1985   MRN: [id number]  Referring Physician: Referring Practitioner: Dr. Hogan Officer      Evaluation Date: 10/3/2018      Medical Diagnosis Information:  Diagnosis: Lumbar Facet Arthropathy M47.816; Left sided chronic LBP (M54.5)    Treatment Diagnosis: LBP (M54.5)                                          Insurance information: PT Insurance Information: MED [place]  - 50/25-1000D-[facility]-90/10-$0CP-PT/OT MED NEC     Precautions/ Contra-indications: L4-L5 Discectomy / Laminectomy 2017  Latex Allergy:  [x]NO      []YES  Preferred Language for Healthcare:   [x]English       []other:    SUBJECTIVE: Patient stated complaint:Patient reports last year had discectomy and laminectomy on L4-L5 with left radicular symptoms. Patient reports significant improvement and improving left anterior/lateral shin pain. Describes as dull ache rather than numbness. Symptoms have now progressing into pain and limitation with extension and twisted motions. Pain across belt line and left hip to lateral hip with prolonged sitting and driving.   Able to tolerate standing and walking well for the Prone knee flexion test (innominate) negative     Femoral nerve tension (L2-4)      Achilles reflex/Pheasant test (S1-2)            Reflexes/Sensation: Decrease L4 myotome pattern   [x]Dermatomes/Myotomes intact    []UE Reflexes     []Normal []Hypo      []Hyper   []LE Reflexes     []Normal []Hypo      []Hyper   []Babinski/Clonus/Hoffmans:    []Other:    Joint mobility: Good PA mobility, decrease left rotational / transverse rotational mobility   [x]Normal    []Hypo   []Hyper    Palpation: Left PSIS, left greater trochanter, L3-S1 SP/ left TP    Functional Mobility/Transfers: Log rolls for transfers on table    Posture: Left PSIS / IC elevated    Bandages/Dressings/Incisions: NA    Gait: (include devices/WB status) WNL    Orthopedic Special Tests: Positive Scot's bilaterally                       [x] Patient history, allergies, meds reviewed. Medical chart reviewed. See intake form. Review Of Systems (ROS):  [x]Performed Review of systems (Integumentary, CardioPulmonary, Neurological) by intake and observation. Intake form has been scanned into medical record. Patient has been instructed to contact their primary care physician regarding ROS issues if not already being addressed at this time.       Co-morbidities/Complexities (which will affect course of rehabilitation):   []None           Arthritic conditions   []Rheumatoid arthritis (M05.9)  []Osteoarthritis (M19.91)   Cardiovascular conditions   []Hypertension (I10)  []Hyperlipidemia (E78.5)  []Angina pectoris (I20)  []Atherosclerosis (I70)   Musculoskeletal conditions   [x]Disc pathology   []Congenital spine pathologies   [x]Prior surgical intervention  []Osteoporosis (M81.8)  []Osteopenia (M85.8)   Endocrine conditions   []Hypothyroid (E03.9)  []Hyperthyroid Gastrointestinal conditions   []Constipation (D42.43)   Metabolic conditions   []Morbid obesity (E66.01)  []Diabetes type 1(E10.65) or 2 (E11.65)   []Neuropathy (G60.9)     Pulmonary conditions []Asthma (J45)  []Coughing   []COPD (J44.9)   Psychological Disorders  []Anxiety (F41.9)  []Depression (F32.9)   []Other:   []Other:          Barriers to/and or personal factors that will affect rehab potential:              []Age  []Sex              []Motivation/Lack of Motivation                        []Co-Morbidities              []Cognitive Function, education/learning barriers              []Environmental, home barriers              []profession/work barriers  []past PT/medical experience  []other:  Justification: Patient highly motivated to return to [facility] and with history of medical experience patient should benefit well from formal PT. Falls Risk Assessment (30 days):   [x] Falls Risk assessed and no intervention required. [] Falls Risk assessed and Patient requires intervention due to being higher risk   TUG score (>12s at risk):     [] Falls education provided, including       G-Codes:  PT G-Codes  Functional Assessment Tool Used: Modified Oswestry  Score: 12%  Changing and Maintaining Body Position Current Status (): At least 1 percent but less than 20 percent impaired, limited or restricted  Changing and Maintaining Body Position Goal Status ():  At least 1 percent but less than 20 percent impaired, limited or restricted    ASSESSMENT:   Functional Impairments:     [x]Noted lumbar/proximal hip hypomobility   []Noted lumbosacral and/or generalized hypermobility   [x]Decreased Lumbosacral/hip/LE functional ROM   []Decreased core/proximal hip strength and neuromuscular control    [x]Decreased LE functional strength    []Abnormal reflexes/sensation/myotomal/dermatomal deficits  []Reduced balance/proprioceptive control    []other:      Functional Activity Limitations (from functional questionnaire and intake)   [x]Reduced ability to tolerate prolonged functional positions   [x]Reduced ability or difficulty with changes of positions or transfers between positions   [x]Reduced ability to

## 2018-10-04 ENCOUNTER — TELEPHONE (OUTPATIENT)
Dept: ORTHOPEDIC SURGERY | Age: 33
End: 2018-10-04

## 2018-10-08 ENCOUNTER — HOSPITAL ENCOUNTER (OUTPATIENT)
Dept: PHYSICAL THERAPY | Age: 33
Setting detail: THERAPIES SERIES
Discharge: HOME | End: 2018-10-08
Payer: COMMERCIAL

## 2018-10-08 PROCEDURE — 97140 MANUAL THERAPY 1/> REGIONS: CPT | Performed by: PHYSICAL THERAPIST

## 2018-10-08 PROCEDURE — 97110 THERAPEUTIC EXERCISES: CPT | Performed by: PHYSICAL THERAPIST

## 2018-10-08 NOTE — FLOWSHEET NOTE
[facility], [address]  Phone: [phone]  Fax [phone]    Physical Therapy Daily Treatment Note  Date:  10/8/2018    Patient Name:  Jaida Saldivar    :  1985  MRN: [id number]  Restrictions/Precautions:    Physician Information:  Referring Practitioner: Dr. Cr Platt  Medical/Treatment Diagnosis Information:  Diagnosis: Lumbar Facet Arthropathy M47.816; Left sided chronic LBP (M54.5)   · Treatment Diagnosis:LBP (M54.5)     [x] Conservative / [] Surgical - DOS:  Therapy Diagnosis/Practice Pattern:  Practice Pattern F: Spinal Disorders  Insurance/Certification information:  PT Insurance Information: MED [place]  - 50/25-1000D-MET-90/10-$0CP-PT/OT MED NEC  Plan of care signed: [] YES  [x] NO  Number of Comorbidities:  []0     [x]1-2    []3+  Date of Patient follow up with Physician:     G-Code (if applicable):      Date G-Code Applied:  10/3/18  PT G-Codes  Functional Assessment Tool Used: Modified Oswestry  Score: 12%  Changing and Maintaining Body Position Current Status (): At least 1 percent but less than 20 percent impaired, limited or restricted  Changing and Maintaining Body Position Goal Status (): At least 1 percent but less than 20 percent impaired, limited or restricted    Progress Note: [x]  Yes  []  No  Next due by: Visit #10        Latex Allergy:  [x]NO      []YES  Preferred Language for Healthcare:   [x]English       []other:    Visit # Insurance Allowable Reporting Period   2 BMN Begin Date: 10/3/2018               End Date:      RECERT DUE BY: 6 weeks     SUBJECTIVE:     Pt has a neurotomy scheduled on oct 22nd. Pt sleeps well. Pt's pain stays localized in left low back. Occasionally, it will radiate into buttock. Pt is not taking any meds for her back. Ibuprofen prn. Pt has no problems with HEP, but ITB stretch she felt all the way down the leg.        OBJECTIVE: See

## 2018-10-10 ENCOUNTER — HOSPITAL ENCOUNTER (OUTPATIENT)
Dept: PHYSICAL THERAPY | Age: 33
Setting detail: THERAPIES SERIES
Discharge: HOME | End: 2018-10-10
Payer: COMMERCIAL

## 2018-10-10 PROCEDURE — 97110 THERAPEUTIC EXERCISES: CPT | Performed by: PHYSICAL THERAPIST

## 2018-10-10 PROCEDURE — 97112 NEUROMUSCULAR REEDUCATION: CPT | Performed by: PHYSICAL THERAPIST

## 2018-10-10 PROCEDURE — 97140 MANUAL THERAPY 1/> REGIONS: CPT | Performed by: PHYSICAL THERAPIST

## 2018-10-10 NOTE — FLOWSHEET NOTE
[facility], [address]  Phone: [phone]  Fax [phone]    Physical Therapy Daily Treatment Note  Date:  10/10/2018    Patient Name:  Jone Philippe    :  1985  MRN: [id number]  Restrictions/Precautions:    Physician Information:  Referring Practitioner: Dr. Jaki Vazquez  Medical/Treatment Diagnosis Information:  Diagnosis: Lumbar Facet Arthropathy M47.816; Left sided chronic LBP (M54.5)   · Treatment Diagnosis:LBP (M54.5)     [x] Conservative / [] Surgical - DOS:  Therapy Diagnosis/Practice Pattern:  Practice Pattern F: Spinal Disorders  Insurance/Certification information:  PT Insurance Information: MED [place]  - 50/25-1000D-MET-90/10-$0CP-PT/OT MED [facility]  Plan of care signed: [] YES  [x] NO  Number of Comorbidities:  []0     [x]1-2    []3+  Date of Patient follow up with Physician:     G-Code (if applicable):      Date G-Code Applied:  10/3/18  PT G-Codes  Functional Assessment Tool Used: Modified Oswestry  Score: 12%  Changing and Maintaining Body Position Current Status (): At least 1 percent but less than 20 percent impaired, limited or restricted  Changing and Maintaining Body Position Goal Status (): At least 1 percent but less than 20 percent impaired, limited or restricted    Progress Note: [x]  Yes  []  No  Next due by: Visit #10        Latex Allergy:  [x]NO      []YES  Preferred Language for Healthcare:   [x]English       []other:    Visit # Insurance Allowable Reporting Period   3 BMN Begin Date: 10/3/2018               End Date:      RECERT DUE BY: 6 weeks     SUBJECTIVE:    Extremely sore yesterday in lateral hip. Took ibuprofen and feels fine today. Complaint with HEP. Left low back pain unchanged.       OBJECTIVE: See eval  Observation:  Palpation:     Test used Initial score Current Score   Pain Summary VAS 3-4/10 3   Functional questionnaire OSW 12%    ROM Lumbar Flexion 75% training and instruction to the patient for proper core and proximal hip recruitment and positioning with ambulation re-education     Home Exercise Program:    [x] (00396) Reviewed/Progressed HEP activities related to strengthening, flexibility, endurance, ROM of core, proximal hip and LE for functional self-care, mobility, lifting and ambulation   [] (24909) Reviewed/Progressed HEP activities related to improving balance, coordination, kinesthetic sense, posture, motor skill, proprioception of core, proximal hip and LE for self care, mobility, lifting, and ambulation      Manual Treatments:  PROM / STM / Oscillations-Mobs:  G-I, II, III, IV (PA's, Inf., Post.)  [x] (82745) Provided manual therapy to mobilize proximal hip and LS spine soft tissue/joints for the purpose of modulating pain, promoting relaxation,  increasing ROM, reducing/eliminating soft tissue swelling/inflammation/restriction, improving soft tissue extensibility and allowing for proper ROM for normal function with self care, mobility, lifting and ambulation. Modalities:       Charges:  Timed Code Treatment Minutes: 40   Total Treatment Minutes: 40     [] EVAL (LOW) 49601 (typically 20 minutes face-to-face)  [] EVAL (MOD) 26754 (typically 30 minutes face-to-face)  [] EVAL (HIGH) 41384 (typically 45 minutes face-to-face)  [] RE-EVAL     [x] SS(56985) x  1   [] IONTO  [x] NMR (37526) x  1   [] VASO  [x] Manual (59334) x  1    [] Other:  [] TA x       [] Mech Traction (23031)  [] ES(attended) (31025)      [] ES (un) (08081):     Goals:   Patient stated goal: Decrease pain ; return to exercise     Therapist goals for Patient:   Short Term Goals: To be achieved in: 2 weeks  1. Independent in HEP and progression per patient tolerance, in order to prevent re-injury. 2. Patient will have a decrease in pain to facilitate improvement in movement, function, and ADLs as indicated by Functional Deficits.     Long Term Goals: To be achieved in: 6 weeks  1.

## 2018-10-16 ENCOUNTER — TELEPHONE (OUTPATIENT)
Dept: ORTHOPEDIC SURGERY | Age: 33
End: 2018-10-16

## 2018-10-17 ENCOUNTER — HOSPITAL ENCOUNTER (OUTPATIENT)
Dept: PHYSICAL THERAPY | Age: 33
Setting detail: THERAPIES SERIES
Discharge: HOME | End: 2018-10-17
Payer: COMMERCIAL

## 2018-10-17 PROCEDURE — 97112 NEUROMUSCULAR REEDUCATION: CPT | Performed by: PHYSICAL THERAPIST

## 2018-10-17 PROCEDURE — 97110 THERAPEUTIC EXERCISES: CPT | Performed by: PHYSICAL THERAPIST

## 2018-10-17 PROCEDURE — 97140 MANUAL THERAPY 1/> REGIONS: CPT | Performed by: PHYSICAL THERAPIST

## 2018-10-17 NOTE — FLOWSHEET NOTE
[facility], [address] [place]  Phone: [phone]  Fax [phone]    ATHLETIC TRAINING [address]  Date:  10/17/2018    Patient Name:  Yared Estrada    :  1985  MRN: [id number]  Restrictions/Precautions:    Medical/Treatment Diagnosis Information:  ·  Lumbar facet arthropathy, L sided chronic LBP  ·  LBP  Physician Information:   Cassie Garcia                                 Activity Log                                                               DOS/DOI:                                                             Date: 10/10/18 10/17/18   ATC Commun: Hx of laminectomy Dec 2017, had a baby in April/May 2017 Hips/glutes weak. Difficulty maintaining hip position in WIP    Bike     Airdyne     Elliptical     Treadmill          Hamstring stretch     Quad stretch     Hip flexor stretch     Piriformis stretch     Gastroc stretch     Soleus stretch          Leg press  Bilat. Unilat. Knee ext. Knee flex. Squats   Mini                    Wall                    BOSU          Step   Up                Up and Over                Lat. Down                     [facility]  Flex. ABd               ADd               Ext          Cable Column/Theraband    Rows                                                   Ext. Lat. pulldown                                                   Chops                                                   Trunk Rot.           Reformer TrA/BKFO 10x 10x   Reformer FW Parallel Toes 1R1B ball add 10x 1R1B ball add 10x   Reformer FW Walking 1R1B 10x 1R1B 10x   Reformer Bridges w add All spr 10x3\" All spr 10x3\"        Modality declined-time declined   Initials JLW                    DTM   Time spent with PT assistant
indicated by Functional Deficits.     Long Term Goals: To be achieved in: 6 weeks  1. Disability index score of 6% or less for the OSW  to assist with reaching prior level of function. 2. Patient will demonstrate increased AROM to WNL, good LS mobility, good hip ROM to allow for proper joint functioning as indicated by patients Functional Deficits. 3. Patient will demonstrate an increase in Strength to good proximal hip and core activation to allow for proper functional mobility as indicated by patients Functional Deficits. 4. Patient will return to work and functional activities with minimal increase symptoms or restriction. 5. Patient will tolerate driving for 63-49 minutes without increase of pain to 3/10 or greater.                  New or Updated Goals (if applicable):  [x] No change to goals established upon initial eval/last progress note:  New Goals:    Progression Towards Functional goals:   [] Patient is progressing as expected towards functional goals listed. [x] Progression is slowed due to complexities listed. [] Progression has been slowed due to co-morbidities. [] Plan just implemented, too soon to assess goals progression  [] Other:     ASSESSMENT:    [] Improvement noted relative to goals:  [] No Improvement noted related to goals:  Summary/Patient's response to treatment: Patient tolerating therapy well and showing less tenderness of the hip / ITB. Continue to progress reformer work and core stability.      Treatment/Activity Tolerance:  [x] Patient tolerated treatment well [] Patient limited by fatique  [] Patient limited by pain  [] Patient limited by other medical complications  [] Other:     Prognosis: [x] Good [] Fair  [] Poor    Patient Requires Follow-up: [x] Yes  [] No    PLAN: See eval  [x] Continue per plan of care [] Alter current plan (see comments)  [] Plan of care initiated [] Hold pending MD visit [] Discharge    Electronically signed by: Jeremias Betancourt PT

## 2018-10-22 ENCOUNTER — HOSPITAL ENCOUNTER (OUTPATIENT)
Age: 33
Setting detail: OUTPATIENT SURGERY
Discharge: HOME | End: 2018-10-22
Attending: PHYSICAL MEDICINE & REHABILITATION | Admitting: PHYSICAL MEDICINE & REHABILITATION
Payer: COMMERCIAL

## 2018-10-22 VITALS
WEIGHT: 205 LBS | SYSTOLIC BLOOD PRESSURE: 106 MMHG | DIASTOLIC BLOOD PRESSURE: 62 MMHG | TEMPERATURE: 97.6 F | HEIGHT: 68 IN | RESPIRATION RATE: 16 BRPM | HEART RATE: 59 BPM | BODY MASS INDEX: 31.07 KG/M2 | OXYGEN SATURATION: 100 %

## 2018-10-22 LAB — PREGNANCY, URINE: NEGATIVE

## 2018-10-22 PROCEDURE — 2500000003 HC RX 250 WO HCPCS: Performed by: PHYSICAL MEDICINE & REHABILITATION

## 2018-10-22 PROCEDURE — 2709999900 HC NON-CHARGEABLE SUPPLY: Performed by: PHYSICAL MEDICINE & REHABILITATION

## 2018-10-22 PROCEDURE — 7100000011 HC PHASE II RECOVERY - ADDTL 15 MIN: Performed by: PHYSICAL MEDICINE & REHABILITATION

## 2018-10-22 PROCEDURE — 99152 MOD SED SAME PHYS/QHP 5/>YRS: CPT | Performed by: PHYSICAL MEDICINE & REHABILITATION

## 2018-10-22 PROCEDURE — 3600000012 HC SURGERY LEVEL 2 ADDTL 15MIN: Performed by: PHYSICAL MEDICINE & REHABILITATION

## 2018-10-22 PROCEDURE — 2580000003 HC RX 258: Performed by: PHYSICAL MEDICINE & REHABILITATION

## 2018-10-22 PROCEDURE — 3600000002 HC SURGERY LEVEL 2 BASE: Performed by: PHYSICAL MEDICINE & REHABILITATION

## 2018-10-22 PROCEDURE — 6360000002 HC RX W HCPCS: Performed by: PHYSICAL MEDICINE & REHABILITATION

## 2018-10-22 PROCEDURE — 84703 CHORIONIC GONADOTROPIN ASSAY: CPT

## 2018-10-22 PROCEDURE — 7100000010 HC PHASE II RECOVERY - FIRST 15 MIN: Performed by: PHYSICAL MEDICINE & REHABILITATION

## 2018-10-22 RX ORDER — MIDAZOLAM HYDROCHLORIDE 1 MG/ML
INJECTION INTRAMUSCULAR; INTRAVENOUS
Status: DISCONTINUED
Start: 2018-10-22 | End: 2018-10-22 | Stop reason: HOSPADM

## 2018-10-22 RX ORDER — MIDAZOLAM HYDROCHLORIDE 5 MG/ML
INJECTION INTRAMUSCULAR; INTRAVENOUS PRN
Status: DISCONTINUED | OUTPATIENT
Start: 2018-10-22 | End: 2018-10-22 | Stop reason: HOSPADM

## 2018-10-22 RX ORDER — LIDOCAINE HYDROCHLORIDE 10 MG/ML
INJECTION, SOLUTION EPIDURAL; INFILTRATION; INTRACAUDAL; PERINEURAL PRN
Status: DISCONTINUED | OUTPATIENT
Start: 2018-10-22 | End: 2018-10-22 | Stop reason: HOSPADM

## 2018-10-22 RX ORDER — FENTANYL CITRATE 50 UG/ML
INJECTION, SOLUTION INTRAMUSCULAR; INTRAVENOUS
Status: DISCONTINUED
Start: 2018-10-22 | End: 2018-10-22 | Stop reason: HOSPADM

## 2018-10-22 RX ORDER — FENTANYL CITRATE 50 UG/ML
INJECTION, SOLUTION INTRAMUSCULAR; INTRAVENOUS PRN
Status: DISCONTINUED | OUTPATIENT
Start: 2018-10-22 | End: 2018-10-22 | Stop reason: HOSPADM

## 2018-10-22 RX ORDER — SODIUM CHLORIDE, SODIUM LACTATE, POTASSIUM CHLORIDE, CALCIUM CHLORIDE 600; 310; 30; 20 MG/100ML; MG/100ML; MG/100ML; MG/100ML
INJECTION, SOLUTION INTRAVENOUS ONCE
Status: COMPLETED | OUTPATIENT
Start: 2018-10-22 | End: 2018-10-22

## 2018-10-22 RX ADMIN — LIDOCAINE HYDROCHLORIDE 0.1 ML: 10 INJECTION, SOLUTION EPIDURAL; INFILTRATION; INTRACAUDAL; PERINEURAL at 09:05

## 2018-10-22 RX ADMIN — SODIUM CHLORIDE, POTASSIUM CHLORIDE, SODIUM LACTATE AND CALCIUM CHLORIDE: 600; 310; 30; 20 INJECTION, SOLUTION INTRAVENOUS at 09:04

## 2018-10-22 ASSESSMENT — PAIN - FUNCTIONAL ASSESSMENT: PAIN_FUNCTIONAL_ASSESSMENT: 0-10

## 2018-10-22 ASSESSMENT — ACTIVITIES OF DAILY LIVING (ADL): EFFECT OF PAIN ON DAILY ACTIVITIES: ANY ACTIVITY

## 2018-10-22 ASSESSMENT — PAIN DESCRIPTION - DESCRIPTORS: DESCRIPTORS: ACHING;BURNING

## 2018-10-24 ENCOUNTER — HOSPITAL ENCOUNTER (OUTPATIENT)
Dept: PHYSICAL THERAPY | Age: 33
Setting detail: THERAPIES SERIES
Discharge: HOME | End: 2018-10-24
Payer: COMMERCIAL

## 2018-10-24 PROCEDURE — 97112 NEUROMUSCULAR REEDUCATION: CPT | Performed by: PHYSICAL THERAPIST

## 2018-10-24 PROCEDURE — 97110 THERAPEUTIC EXERCISES: CPT | Performed by: PHYSICAL THERAPIST

## 2018-10-24 PROCEDURE — 97140 MANUAL THERAPY 1/> REGIONS: CPT | Performed by: PHYSICAL THERAPIST

## 2018-10-29 ENCOUNTER — HOSPITAL ENCOUNTER (OUTPATIENT)
Dept: PHYSICAL THERAPY | Age: 33
Setting detail: THERAPIES SERIES
Discharge: HOME | End: 2018-10-29
Payer: COMMERCIAL

## 2018-10-29 PROCEDURE — 97110 THERAPEUTIC EXERCISES: CPT | Performed by: PHYSICAL THERAPIST

## 2018-10-29 PROCEDURE — 97140 MANUAL THERAPY 1/> REGIONS: CPT | Performed by: PHYSICAL THERAPIST

## 2018-10-29 PROCEDURE — 97112 NEUROMUSCULAR REEDUCATION: CPT | Performed by: PHYSICAL THERAPIST

## 2018-10-29 NOTE — FLOWSHEET NOTE
movement, function, and ADLs as indicated by Functional Deficits.     Long Term Goals: To be achieved in: 6 weeks  1. Disability index score of 6% or less for the OSW  to assist with reaching prior level of function. 2. Patient will demonstrate increased AROM to WNL, good LS mobility, good hip ROM to allow for proper joint functioning as indicated by patients Functional Deficits. - progressing  3. Patient will demonstrate an increase in Strength to good proximal hip and core activation to allow for proper functional mobility as indicated by patients Functional Deficits. 4. Patient will return to work and functional activities with minimal increase symptoms or restriction. - Progressing  5. Patient will tolerate driving for 11-69 minutes without increase of pain to 3/10 or greater. - Progressing             New or Updated Goals (if applicable):  [x] No change to goals established upon initial eval/last progress note:  New Goals:    Progression Towards Functional goals:   [] Patient is progressing as expected towards functional goals listed. [x] Progression is slowed due to complexities listed. [] Progression has been slowed due to co-morbidities. [] Plan just implemented, too soon to assess goals progression  [] Other:     ASSESSMENT:    [] Improvement noted relative to goals:  [] No Improvement noted related to goals:  Summary/Patient's response to treatment: Used cupping this date on distal ITB to help alleviate symptoms. Patient demonstrated improved lumbar mobility today.      Treatment/Activity Tolerance:  [x] Patient tolerated treatment well [] Patient limited by fatique  [] Patient limited by pain  [] Patient limited by other medical complications  [] Other:     Prognosis: [x] Good [] Fair  [] Poor    Patient Requires Follow-up: [x] Yes  [] No    PLAN: See eval  [x] Continue per plan of care [] Alter current plan (see comments)  [] Plan of care initiated [] Hold pending MD visit []

## 2018-10-31 ENCOUNTER — HOSPITAL ENCOUNTER (OUTPATIENT)
Dept: PHYSICAL THERAPY | Age: 33
Setting detail: THERAPIES SERIES
Discharge: HOME | End: 2018-10-31
Payer: COMMERCIAL

## 2018-10-31 PROCEDURE — 97112 NEUROMUSCULAR REEDUCATION: CPT | Performed by: PHYSICAL THERAPIST

## 2018-10-31 PROCEDURE — 97140 MANUAL THERAPY 1/> REGIONS: CPT | Performed by: PHYSICAL THERAPIST

## 2018-10-31 PROCEDURE — 97110 THERAPEUTIC EXERCISES: CPT | Performed by: PHYSICAL THERAPIST

## 2018-10-31 NOTE — FLOWSHEET NOTE
Observation:  Palpation:     Test used Initial score Current Score   Pain Summary VAS 3-4/10 3   Functional questionnaire OSW 12%    ROM Lumbar Flexion 75%     Lumbar Ext 50%     Lumbar SB L/R 50%     Lumbar rotation L/R     Strength DF 4/5     ABD 3+ left     TrA       RESTRICTIONS/PRECAUTIONS: L4-L5 Discectomy / Laminectomy December 2017    Exercises/Interventions: See Reformer     Therapeutic Ex sets/reps comments   Supine TA HEP   Supine TA with march HEP   Supine TA with leg walkout into ext X 10     Supine TA in TT with heel tap 2 x 10    Quadruped pelvic rocking 10 x     Quadruped Alt LE 10 x / with UE 10 x     Piriformis stretch / fig 4 stretch 3 x 20\" / 3 x 20\" HEP   ITB stretch with strap / HS stretch with strap 2 x 20\" HEP   Bridges with TA and hip abd HEP   Clamshells HEP   LTR HEP  HEP   SB DKC/   SB LTR X 15 /  X 10 hep   SB  bridges X 20  hep   Supine Hip flex s hep   TB lateral side stepping hep   Incline s 3x  :20   SB wall squats with alt one arm row  hep   Prone SB UE/ LE ext 20 x     SB supine walkout with bridge / march 10 x / 5 x     Std ITB stretch with arm   10 x 5\" for left     Manual Intervention      Prone PA glides / SI joint mobs     Left LAD / SI manip     SL opening flexion mobilization      STM to glute / roller to ITB / x 3min, cupping x 4 min     STM to left paraspinals          Lumbopelvic roll X 3 min / MET x 2  Long sit test Left long to short         NMR re-education                                 Therapeutic Exercise and NMR EXR  [x] (05758) Provided verbal/tactile cueing for activities related to strengthening, flexibility, endurance, ROM  for improvements in proximal hip and core control with self care, mobility, lifting and ambulation.   [x] (45854) Provided verbal/tactile cueing for activities related to improving balance, coordination, kinesthetic sense, posture, motor skill, proprioception  to assist with core control in self care, mobility, lifting, and achieved in: 2 weeks  1. Independent in HEP and progression per patient tolerance, in order to prevent re-injury. 2. Patient will have a decrease in pain to facilitate improvement in movement, function, and ADLs as indicated by Functional Deficits.     Long Term Goals: To be achieved in: 6 weeks  1. Disability index score of 6% or less for the OSW  to assist with reaching prior level of function. 2. Patient will demonstrate increased AROM to WNL, good LS mobility, good hip ROM to allow for proper joint functioning as indicated by patients Functional Deficits. - progressing  3. Patient will demonstrate an increase in Strength to good proximal hip and core activation to allow for proper functional mobility as indicated by patients Functional Deficits. 4. Patient will return to work and functional activities with minimal increase symptoms or restriction. - Progressing  5. Patient will tolerate driving for 43-66 minutes without increase of pain to 3/10 or greater. - Progressing             New or Updated Goals (if applicable):  [x] No change to goals established upon initial eval/last progress note:  New Goals:    Progression Towards Functional goals:   [] Patient is progressing as expected towards functional goals listed. [x] Progression is slowed due to complexities listed. [] Progression has been slowed due to co-morbidities. [] Plan just implemented, too soon to assess goals progression  [] Other:     ASSESSMENT:    [] Improvement noted relative to goals:  [] No Improvement noted related to goals:  Summary/Patient's response to treatment: Patient demonstrating less tissue restriction of lateral quad and ITB today. Continue progressing core strength. Assess pelvic alignment NV.      Treatment/Activity Tolerance:  [x] Patient tolerated treatment well [] Patient limited by fatique  [] Patient limited by pain  [] Patient limited by other medical complications  [] Other:     Prognosis: [x] Good []

## 2018-11-09 ENCOUNTER — HOSPITAL ENCOUNTER (OUTPATIENT)
Dept: PHYSICAL THERAPY | Age: 33
Setting detail: THERAPIES SERIES
Discharge: HOME | End: 2018-11-09
Payer: COMMERCIAL

## 2018-11-09 PROCEDURE — 97110 THERAPEUTIC EXERCISES: CPT | Performed by: PHYSICAL THERAPIST

## 2018-11-09 PROCEDURE — 97140 MANUAL THERAPY 1/> REGIONS: CPT | Performed by: PHYSICAL THERAPIST

## 2018-11-09 PROCEDURE — 97112 NEUROMUSCULAR REEDUCATION: CPT | Performed by: PHYSICAL THERAPIST

## 2018-11-09 NOTE — FLOWSHEET NOTE
kinesthetic sense, posture, motor skill, proprioception  to assist with core control in self care, mobility, lifting, and ambulation. Therapeutic Activities:    [] (09422 or 13971) Provided verbal/tactile cueing for activities related to improving balance, coordination, kinesthetic sense, posture, motor skill, proprioception and motor activation to allow for proper function  with self care and ADLs  [] (14844) Provided training and instruction to the patient for proper core and proximal hip recruitment and positioning with ambulation re-education     Home Exercise Program:    [x] (29818) Reviewed/Progressed HEP activities related to strengthening, flexibility, endurance, ROM of core, proximal hip and LE for functional self-care, mobility, lifting and ambulation   [] (74255) Reviewed/Progressed HEP activities related to improving balance, coordination, kinesthetic sense, posture, motor skill, proprioception of core, proximal hip and LE for self care, mobility, lifting, and ambulation      Manual Treatments:  PROM / STM / Oscillations-Mobs:  G-I, II, III, IV (PA's, Inf., Post.)  [x] (97451) Provided manual therapy to mobilize proximal hip and LS spine soft tissue/joints for the purpose of modulating pain, promoting relaxation,  increasing ROM, reducing/eliminating soft tissue swelling/inflammation/restriction, improving soft tissue extensibility and allowing for proper ROM for normal function with self care, mobility, lifting and ambulation.      Modalities:       Charges:  Timed Code Treatment Minutes: 40   Total Treatment Minutes: 40     [] EVAL (LOW) 11292 (typically 20 minutes face-to-face)  [] EVAL (MOD) 12524 (typically 30 minutes face-to-face)  [] EVAL (HIGH) 65278 (typically 45 minutes face-to-face)  [] RE-EVAL     [x] BR(15186) x  1   [] IONTO  [x] NMR (79385) x  1   [] VASO  [x] Manual (65419) x  1    [] Other:  [] TA x       [] Mech Traction (58336)  [] ES(attended) (84869)      [] ES (un) (91471): Patient limited by other medical complications  [] Other:     Prognosis: [x] Good [] Fair  [] Poor    Patient Requires Follow-up: [x] Yes  [] No    PLAN: See eval  [x] Continue per plan of care [] Alter current plan (see comments)  [] Plan of care initiated [] Hold pending MD visit [] Discharge    Electronically signed by: Onnie Closs, PT

## 2018-11-12 ENCOUNTER — HOSPITAL ENCOUNTER (OUTPATIENT)
Dept: PHYSICAL THERAPY | Age: 33
Setting detail: THERAPIES SERIES
Discharge: HOME | End: 2018-11-12
Payer: COMMERCIAL

## 2018-11-12 PROCEDURE — 97110 THERAPEUTIC EXERCISES: CPT | Performed by: PHYSICAL THERAPIST

## 2018-11-12 PROCEDURE — 97140 MANUAL THERAPY 1/> REGIONS: CPT | Performed by: PHYSICAL THERAPIST

## 2018-11-12 PROCEDURE — 97112 NEUROMUSCULAR REEDUCATION: CPT | Performed by: PHYSICAL THERAPIST

## 2018-11-12 NOTE — FLOWSHEET NOTE
[facility], [address] [place] [address]  Phone: [phone]  Fax [phone]    Physical Therapy Daily Treatment Note  Date:  2018    Patient Name:  Apple Garcia    :  1985  MRN: [id number]  Restrictions/Precautions:    Physician Information:  Referring Practitioner: Dr. Claudy Cr  Medical/Treatment Diagnosis Information:  Diagnosis: Lumbar Facet Arthropathy M47.816; Left sided chronic LBP (M54.5)   · Treatment Diagnosis:LBP (M54.5)     [x] Conservative / [] Surgical - DOS:  Therapy Diagnosis/Practice Pattern:  Practice Pattern F: Spinal Disorders  Insurance/Certification information:  PT Insurance Information: MED [employer]  - 50/25-1000D-MET-90/10-$0CP-PT/OT MED NEC  Plan of care signed: [] YES  [x] NO  Number of Comorbidities:  []0     [x]1-2    []3+  Date of Patient follow up with Physician:     G-Code (if applicable):      Date G-Code Applied:  10/3/18  PT G-Codes  Functional Assessment Tool Used: Modified Oswestry  Score: 12%  Changing and Maintaining Body Position Current Status (): At least 1 percent but less than 20 percent impaired, limited or restricted  Changing and Maintaining Body Position Goal Status (): At least 1 percent but less than 20 percent impaired, limited or restricted    Progress Note: [x]  Yes  []  No  Next due by: Visit #10        Latex Allergy:  [x]NO      []YES  Preferred Language for Healthcare:   [x]English       []other:    Visit # Insurance Allowable Reporting Period   9 BMN Begin Date: 10/3/2018               End Date:      RECERT DUE BY: 6 weeks     SUBJECTIVE:    Cupping gave a few hours of relief. She sat for three hours on Friday after treatment. Pt is sleeping well. Pt is taking two aleve consistently.        OBJECTIVE:   Observation:  Palpation:     Test used Initial score Current Score   Pain Summary VAS 3-4/10 1-3 /10 back   Functional questionnaire OSW 12% Independent in HEP and progression per patient tolerance, in order to prevent re-injury. 2. Patient will have a decrease in pain to facilitate improvement in movement, function, and ADLs as indicated by Functional Deficits.     Long Term Goals: To be achieved in: 6 weeks  1. Disability index score of 6% or less for the OSW  to assist with reaching prior level of function. 2. Patient will demonstrate increased AROM to WNL, good LS mobility, good hip ROM to allow for proper joint functioning as indicated by patients Functional Deficits. - progressing  3. Patient will demonstrate an increase in Strength to good proximal hip and core activation to allow for proper functional mobility as indicated by patients Functional Deficits. 4. Patient will return to work and functional activities with minimal increase symptoms or restriction. - Progressing  5. Patient will tolerate driving for 96-89 minutes without increase of pain to 3/10 or greater. - Progressing             New or Updated Goals (if applicable):  [x] No change to goals established upon initial eval/last progress note:  New Goals:    Progression Towards Functional goals:   [] Patient is progressing as expected towards functional goals listed. [x] Progression is slowed due to complexities listed. [] Progression has been slowed due to co-morbidities. [] Plan just implemented, too soon to assess goals progression  [] Other:     ASSESSMENT:    [] Improvement noted relative to goals:  [] No Improvement noted related to goals:  Summary/Patient's response to treatment: Improving with core stability exercises, more control. Tolerated cupping to lumbar spine well today.       Treatment/Activity Tolerance:  [x] Patient tolerated treatment well [] Patient limited by fatique  [] Patient limited by pain  [] Patient limited by other medical complications  [] Other:     Prognosis: [x] Good [] Fair  [] Poor    Patient Requires Follow-up: [x] Yes  [] No    PLAN: See eval  [x] Continue per plan of care [] Alter current plan (see comments)  [] Plan of care initiated [] Hold pending MD visit [] Discharge    Electronically signed by: Sky Garces PT

## 2018-11-12 NOTE — FLOWSHEET NOTE
[facility], [address] [place]  Phone: [phone]  Fax [phone]    ATHLETIC TRAINING [address]  Date:  2018    Patient Name:  Chuckie Mallory    :  1985  MRN: [id number]  Restrictions/Precautions:    Medical/Treatment Diagnosis Information:  ·  Lumbar facet arthropathy, L sided chronic LBP  ·  LBP  Physician Information:   Tayler Solorio                                 Activity Log                                                               DOS/DOI:                                                             Date: 10/10/18 10/17/18 10/31/18 11/12/18   ATC Commun: Hx of laminectomy Dec 2017, had a baby in April/May 2017 Hips/glutes weak. Difficulty maintaining hip position in WIP   Short on time today   Bike       Airdyne       Elliptical       Treadmill              Hamstring stretch       Quad stretch       Hip flexor stretch       Piriformis stretch       Gastroc stretch       Soleus stretch              Leg press  Bilat. Unilat. Knee ext. Knee flex. Squats   Mini                      Wall                      BOSU              Step   Up                  Up and Over                  Lat. Down                         [facility] [place]  Flex. SLS on BOSU, 3-way hip R/L 10x ea, TrA stab             ABd                 ADd                 Ext              Cable Column/Theraband    Rows                                                     Ext. Lat. pulldown                                                     Chops                                                     Trunk Rot.               Reformer TrA/BKFO 10x 10x 10x    Reformer FW Parallel Toes 1R1B ball add 10x 1R1B ball add 10x 1R1B ball add 10x    Reformer FW Walking 1R1B 10x 1R1B 10x 1R1B 10x    Reformer Bridges w add All spr 10x3\" All spr 10x3\" All spr 15x    Reformer FW Parallel Heels   1R1B ring abd 10x    Reformer Arm Straps I in TT   1R 10x    Reformer FW Single Leg Toes TT   1R R/L 10x ea                  Modality declined-time declined declined declined   Initials JLW                    DTM JLW JLW   Time spent with PT assistant

## 2018-11-14 ENCOUNTER — HOSPITAL ENCOUNTER (OUTPATIENT)
Dept: PHYSICAL THERAPY | Age: 33
Setting detail: THERAPIES SERIES
End: 2018-11-14
Payer: COMMERCIAL

## 2018-11-19 ENCOUNTER — HOSPITAL ENCOUNTER (OUTPATIENT)
Dept: PHYSICAL THERAPY | Age: 33
Setting detail: THERAPIES SERIES
Discharge: HOME | End: 2018-11-19
Payer: COMMERCIAL

## 2018-11-19 DIAGNOSIS — M51.36 DDD (DEGENERATIVE DISC DISEASE), LUMBAR: Primary | ICD-10-CM

## 2018-11-19 DIAGNOSIS — M79.18 MYOFASCIAL PAIN: ICD-10-CM

## 2018-11-19 PROCEDURE — G8981 BODY POS CURRENT STATUS: HCPCS | Performed by: PHYSICAL THERAPIST

## 2018-11-19 PROCEDURE — 97110 THERAPEUTIC EXERCISES: CPT | Performed by: PHYSICAL THERAPIST

## 2018-11-19 PROCEDURE — 97112 NEUROMUSCULAR REEDUCATION: CPT | Performed by: PHYSICAL THERAPIST

## 2018-11-19 PROCEDURE — G8982 BODY POS GOAL STATUS: HCPCS | Performed by: PHYSICAL THERAPIST

## 2018-11-19 PROCEDURE — 97140 MANUAL THERAPY 1/> REGIONS: CPT | Performed by: PHYSICAL THERAPIST

## 2018-11-19 NOTE — FLOWSHEET NOTE
[facility], [address] [place] [address]  Phone: [phone]  Fax [phone]    Physical Therapy Daily Treatment Note  Date:  2018    Patient Name:  Loren Bro    :  1985  MRN: [id number]  Restrictions/Precautions:    Physician Information:  Referring Practitioner: Dr. Yonathan Douglas  Medical/Treatment Diagnosis Information:  Diagnosis: Lumbar Facet Arthropathy M47.816; Left sided chronic LBP (M54.5)   · Treatment Diagnosis:LBP (M54.5)     [x] Conservative / [] Surgical - DOS:  Therapy Diagnosis/Practice Pattern:  Practice Pattern F: Spinal Disorders  Insurance/Certification information:  PT Insurance Information: MED [employer]  - 50/25-1000D-MET-90/10-$0CP-PT/OT MED [facility]  Plan of care signed: [] YES  [x] NO  Number of Comorbidities:  []0     [x]1-2    []3+  Date of Patient follow up with Physician:     G-Code (if applicable):      Date G-Code Applied:  10/3/18  PT G-Codes  Functional Assessment Tool Used: Modified Oswestry  Score: 12%  Changing and Maintaining Body Position Current Status (): At least 1 percent but less than 20 percent impaired, limited or restricted  Changing and Maintaining Body Position Goal Status (): At least 1 percent but less than 20 percent impaired, limited or restricted    Progress Note: [x]  Yes  []  No  Next due by: Visit #10        Latex Allergy:  [x]NO      []YES  Preferred Language for Healthcare:   [x]English       []other:    Visit # Insurance Allowable Reporting Period   10 BMN Begin Date: 10/3/2018               End Date:      RECERT DUE BY: 6 weeks     SUBJECTIVE:    Still feels tightness in her glut. Pt feels the cupping does give her relief.        OBJECTIVE:   Observation:  Palpation:     Test used Initial score Current Score   Pain Summary VAS 3-/10 -3 /10 back   Functional questionnaire OSW 12% 10   ROM Lumbar Flexion 75%     Lumbar Ext 50%     Lumbar SB L/R 50% activities related to improving balance, coordination, kinesthetic sense, posture, motor skill, proprioception and motor activation to allow for proper function  with self care and ADLs  [] (01928) Provided training and instruction to the patient for proper core and proximal hip recruitment and positioning with ambulation re-education     Home Exercise Program:    [x] (12811) Reviewed/Progressed HEP activities related to strengthening, flexibility, endurance, ROM of core, proximal hip and LE for functional self-care, mobility, lifting and ambulation   [] (17639) Reviewed/Progressed HEP activities related to improving balance, coordination, kinesthetic sense, posture, motor skill, proprioception of core, proximal hip and LE for self care, mobility, lifting, and ambulation      Manual Treatments:  PROM / STM / Oscillations-Mobs:  G-I, II, III, IV (PA's, Inf., Post.)  [x] (74869) Provided manual therapy to mobilize proximal hip and LS spine soft tissue/joints for the purpose of modulating pain, promoting relaxation,  increasing ROM, reducing/eliminating soft tissue swelling/inflammation/restriction, improving soft tissue extensibility and allowing for proper ROM for normal function with self care, mobility, lifting and ambulation. Modalities:       Charges:  Timed Code Treatment Minutes: 40   Total Treatment Minutes: 40     [] EVAL (LOW) 79627 (typically 20 minutes face-to-face)  [] EVAL (MOD) 47591 (typically 30 minutes face-to-face)  [] EVAL (HIGH) 40146 (typically 45 minutes face-to-face)  [] RE-EVAL     [x] YJ(23311) x  1   [] IONTO  [x] NMR (86835) x  1   [] VASO  [x] Manual (85131) x  1    [] Other:  [] TA x       [] Mech Traction (31840)  [] ES(attended) (52333)      [] ES (un) (56217):     Goals:   Patient stated goal: Decrease pain ; return to exercise     Therapist goals for Patient:   Short Term Goals: To be achieved in: 2 weeks  1.  Independent in HEP and progression per patient tolerance, in order to prevent re-injury. 2. Patient will have a decrease in pain to facilitate improvement in movement, function, and ADLs as indicated by Functional Deficits.     Long Term Goals: To be achieved in: 6 weeks  1. Disability index score of 6% or less for the OSW  to assist with reaching prior level of function. 2. Patient will demonstrate increased AROM to WNL, good LS mobility, good hip ROM to allow for proper joint functioning as indicated by patients Functional Deficits. - progressing  3. Patient will demonstrate an increase in Strength to good proximal hip and core activation to allow for proper functional mobility as indicated by patients Functional Deficits. 4. Patient will return to work and functional activities with minimal increase symptoms or restriction. - Progressing  5. Patient will tolerate driving for 05-06 minutes without increase of pain to 3/10 or greater. - Progressing             New or Updated Goals (if applicable):  [x] No change to goals established upon initial eval/last progress note:  New Goals:    Progression Towards Functional goals:   [] Patient is progressing as expected towards functional goals listed. [x] Progression is slowed due to complexities listed. [] Progression has been slowed due to co-morbidities. [] Plan just implemented, too soon to assess goals progression  [] Other:     ASSESSMENT:    [] Improvement noted relative to goals:  [] No Improvement noted related to goals:  Summary/Patient's response to treatment: Improving with core stability exercises, more control. Tolerated cupping to lumbar spine well today.       Treatment/Activity Tolerance:  [x] Patient tolerated treatment well [] Patient limited by fatique  [] Patient limited by pain  [] Patient limited by other medical complications  [] Other:     Prognosis: [x] Good [] Fair  [] Poor    Patient Requires Follow-up: [x] Yes  [] No    PLAN: See eval  [x] Continue per plan of care [] Alter current plan (see

## 2018-11-19 NOTE — PROGRESS NOTES
[facility], [address]  Phone: [phone]  Fax [phone]      Physical Therapy Progress Note  Date: 2018        Patient Name:  Chasidy Moyer    :  1985  MRN: [id number]  Restrictions/Precautions:     Medical/Treatment Diagnosis Information:   Diagnosis: Lumbar Facet Arthropathy M47.816; Left sided chronic LBP (M54.5)   Treatment Diagnosis: LBP (B56.6)   Insurance/Certification information:  PT Insurance Information: MED [employer]  - 50/25-1000D-MET-90/10-$0CP-PT/OT MED NEC  Physician Information:  Referring Practitioner: Dr. Theodor Kocher of care signed (Y/N):    Visit# / total visits: 10 /  Pain level: -3 /10     G-Code (if applicable):      Date G-Code Applied:  18  PT G-Codes  Functional Assessment Tool Used: Mod Osw  Score: 10  Functional Limitation: Changing and maintaining body position  Changing and Maintaining Body Position Current Status (): At least 1 percent but less than 20 percent impaired, limited or restricted  Changing and Maintaining Body Position Goal Status (): At least 1 percent but less than 20 percent impaired, limited or restricted     Time Period for Report:    10/3/18- 18  Cancels/No-shows to date:     Plan of Care/Treatment to date:  [x] Therapeutic Exercise    [x] Modalities:  [] Therapeutic Activity     [] Ultrasound  [] Electrical Stim  [] Gait Training      [] Cervical Traction    [] Lumbar Traction  [x] Neuromuscular Re-education  [] Cold/hotpack [] Iontophoresis  [x] Instruction in HEP      Other:  [x] Manual Therapy       []    [] Aquatic Therapy       []                    ? Significant Findings At Last Visit/Comments:    Subjective:   Pt still has left glut pain. Pt is pleased with overall progress. Feels like her abdominal control is improving.        Objective:  Observation:  Test measurements:     Test used Initial score Current

## 2018-11-19 NOTE — FLOWSHEET NOTE
[facility], [address] [place]  Phone: [phone]  Fax [phone]    ATHLETIC TRAINING [address]  Date:  2018    Patient Name:  Dexter Avery    :  1985  MRN: [id number]  Restrictions/Precautions:    Medical/Treatment Diagnosis Information:  ·  Lumbar facet arthropathy, L sided chronic LBP  ·  LBP  Physician Information:   Amelia Carrasco                                 Activity Log                                                               DOS/DOI:                                                             Date: 10/31/18 11/12/18 11/19/18   ATC Commun: Hx of laminectomy Dec 2017, had a baby in April/May 2017, L sided symptoms/weakness  Short on time today Doing better, going longer streches without pain during the day. Short on time today   Bike      Airdyne      Elliptical      Treadmill            Hamstring stretch      Quad stretch      Hip flexor stretch      Piriformis stretch      Gastroc stretch      Soleus stretch            Leg press  Bilat. Unilat. Knee ext. Knee flex. Squats   Mini                     Wall                     BOSU            Step   Up                 Up and Over                 Lat. Down                       [facility] [place]  Flex. SLS on BOSU, 3-way hip R/L 10x ea, TrA stab SLS on BOSU, 3-way hip R/L 10x ea, TrA stab             ABd                ADd                Ext            Cable Column/Theraband    Rows                                                    Ext. Lat. pulldown                                                    Chops                                                    Trunk Rot.             Reformer TrA/BKFO 10x  10x   Reformer FW Parallel Toes 1R1B ball add 10x  1R1B ball add 10x   Reformer FW Walking 1R1B 10x  1R1B 15x   Reformer Jacinto rdz

## 2018-11-21 ENCOUNTER — HOSPITAL ENCOUNTER (OUTPATIENT)
Dept: PHYSICAL THERAPY | Age: 33
Setting detail: THERAPIES SERIES
Discharge: HOME | End: 2018-11-21
Payer: COMMERCIAL

## 2018-11-21 PROCEDURE — 97110 THERAPEUTIC EXERCISES: CPT | Performed by: PHYSICAL THERAPIST

## 2018-11-21 PROCEDURE — 97140 MANUAL THERAPY 1/> REGIONS: CPT | Performed by: PHYSICAL THERAPIST

## 2018-11-21 NOTE — FLOWSHEET NOTE
by pain  [] Patient limited by other medical complications  [] Other:     Prognosis: [x] Good [] Fair  [] Poor    Patient Requires Follow-up: [x] Yes  [] No    PLAN: See eval  [x] Continue per plan of care [] Alter current plan (see comments)  [] Plan of care initiated [] Hold pending MD visit [] Discharge    Electronically signed by: Mis Jason PT

## 2018-11-26 ENCOUNTER — HOSPITAL ENCOUNTER (OUTPATIENT)
Dept: PHYSICAL THERAPY | Age: 33
Setting detail: THERAPIES SERIES
End: 2018-11-26
Payer: COMMERCIAL

## 2018-11-28 ENCOUNTER — HOSPITAL ENCOUNTER (OUTPATIENT)
Dept: PHYSICAL THERAPY | Age: 33
Setting detail: THERAPIES SERIES
Discharge: HOME | End: 2018-11-28
Payer: COMMERCIAL

## 2018-11-28 PROCEDURE — 97110 THERAPEUTIC EXERCISES: CPT | Performed by: PHYSICAL THERAPIST

## 2018-11-28 PROCEDURE — 97032 APPL MODALITY 1+ESTIM EA 15: CPT | Performed by: PHYSICAL THERAPIST

## 2018-11-28 PROCEDURE — 97140 MANUAL THERAPY 1/> REGIONS: CPT | Performed by: PHYSICAL THERAPIST

## 2018-11-28 NOTE — FLOWSHEET NOTE
[facility], [address] [place]  Phone: [phone]  Fax [phone]    ATHLETIC TRAINING [address]  Date:  2018    Patient Name:  Bro Luong    :  1985  MRN: [id number]  Restrictions/Precautions:    Medical/Treatment Diagnosis Information:  ·  Lumbar facet arthropathy, L sided chronic LBP  ·  LBP  Physician Information:   Susy Self                                 Activity Log                                                               DOS/DOI:                                                             Date: 10/31/18 11/12/18 11/19/18 11/28/18   ATC Commun: Hx of laminectomy Dec 2017, had a baby in April/May 2017, L sided symptoms/weakness  Short on time today Doing better, going longer streches without pain during the day. Short on time today Short on time today   Bike       Airdyne       Elliptical       Treadmill              Hamstring stretch       Quad stretch       Hip flexor stretch       Piriformis stretch       Gastroc stretch       Soleus stretch              Leg press  Bilat. Unilat. Knee ext. Knee flex. Squats   Mini                      Wall                      BOSU              Step   Up                  Up and Over                  Lat. Down                         [facility]  Flex. SLS on BOSU, 3-way hip R/L 10x ea, TrA stab SLS on BOSU, 3-way hip R/L 10x ea, TrA stab SLS on BOSU, 3-way hip R/L 10x ea, TrA stab             ABd                 ADd                 Ext              Cable Column/Theraband    Rows                                                     Ext. Lat. pulldown                                                     Chops                                                     Trunk Rot.               Reformer TrA/BKFO 10x  10x 10x   Reformer FW Parallel Toes 1R1B ball add 10x  1R1B ball add 10x 1R1B ball add 10x   Reformer FW Walking 1R1B 10x  1R1B 15x 1R1B 15x   Reformer Bridges w add All spr 15x  All spr 15x All spr 15x   Reformer FW Parallel Heels 1R1B ring abd 10x   1R1B ring abd 10x   Reformer Arm Straps I in TT 1R 10x   1R 10x   Reformer FW Single Leg Toes TT 1R R/L 10x ea   1R1B R/L 10x ea                 Modality declined declined declined declined   Initials JLW JLW JLW                 DTM   Time spent with PT assistant

## 2018-11-28 NOTE — FLOWSHEET NOTE
ITB  Palpation: palpable tightness along ITB, hip rotators     Test used Initial score Current Score   Pain Summary VAS 3-4/10 1-3 /10 back   Functional questionnaire OSW 12% 10   ROM Lumbar Flexion 75%     Lumbar Ext 50%     Lumbar SB L/R 50%     Lumbar rotation L/R     Strength DF 4/5     ABD 3+ left     TrA       RESTRICTIONS/PRECAUTIONS: L4-L5 Discectomy / Laminectomy December 2017    Exercises/Interventions: All dry needling performed by Sena Arellano PT, DPT [id number]   Consent signed 11/21/18 and precautions addressed. See media tab. Muscle  Needle Size Technique Notes IES   Site 1 VL x 5 0.30 x 60mm [x] Pistoning / []  Threading  []  Winding/Coning Multiple LTRs throughout [x]    Site 2 Glut Med x2 0.40 x 100mm [] Pistoning / []  Threading  []  Winding/Coning Very tight [x]    Site 3 Glut Min x 2 0.40 x 100mm [x] Pistoning / []  Threading  []  Winding/Coning Very tight [x]    Site 4                    [] Pistoning / []  Threading  []  Winding/Coning  []    Site 5                    [] Pistoning / []  Threading  []  Winding/Coning  []    Site 6                    [] Pistoning / []  Threading  []  Winding/Coning  []    Site 7                    [] Pistoning / []  Threading  []  Winding/Coning  []    Site 8                    [] Pistoning / []  Threading  []  Winding/Coning  []    Site 9                    [] Pistoning / []  Threading  []  Winding/Coning  []    Site 10                    [] Pistoning / []  Threading  []  Winding/Coning  []      **The above techniques were used to restore functional range of motion, reduce muscle spasm, and induce healing in the corresponding musculature by means of intramuscular mobilization. Clean Technique was utilized today while applying the Dry needling treatment. The treatment sites where cleaned with 70% solution of isopropyl alcohol.  The PT washed their hands and utilized treatment gloves along with hand  prior to inserting the needles.  All needles where removed and discarded in the appropriate sharps container. MD has given verbal and/or written approval for this treatment. **      Attended electrical stimulation was applied in conjunction with dry needling to the sites listed in the chart above to help reduce muscle spasm and interrupt the pain cycle: 12 min ea (overlapping) at low frequency (1-20Hz), fine frequency (4Hz), low intensity (0-36mA), output 3-4 volts. (71913)       ** Educated patient on anatomy, trigger point etiology, expectations for TDN (bruising, soreness, etc), outcomes, and recommendations for exercise. **     Therapeutic Ex sets/reps comments   Supine TA HEP   Supine TA with march HEP   Supine TA with leg walkout into ext    Supine TA in TT with heel tap    3D prayer stretch    Quadruped pelvic rocking    Cat/ camel    Quadruped Alt LE     Supine outer hip stretch 3 x 30\"     Fig 4 stretch 3 x 30\" HEP   ITB stretch with strap / HS stretch with strap HEP   Bridges with TA and hip abd HEP   Clamshells HEP   LTR HEP   SB DKC/   SB LTR hep   SB  bridges hep   Supine Hip flex s hep   TB lateral side stepping hep   Incline s    SB wall squats with alt one arm row  hep   Prone SB UE/ LE ext    SB supine walkout with bridge / march    Std ITB stretch with arm      Manual Intervention      Prone PA glides / SI joint mobs     Left LAD / SI manip     SL opening flexion mobilization      STM to glute / roller to ITB  3min,      Cupping to left paraspinals   Followed by prayer stretch   Lumbopelvic roll X 3 min   Long sit test Left long to short    TDN (see above), STM ITB, glut med/min 23'    NMR re-education                                 Therapeutic Exercise and NMR EXR  [x] (44481) Provided verbal/tactile cueing for activities related to strengthening, flexibility, endurance, ROM  for improvements in proximal hip and core control with self care, mobility, lifting and ambulation.   [x] (36530) Provided verbal/tactile cueing for activities Traction (67083)  [x] ES(attended) (81652)      [] ES (un) (37803):     Goals:   Patient stated goal: Decrease pain ; return to exercise     Therapist goals for Patient:   Short Term Goals: To be achieved in: 2 weeks  1. Independent in HEP and progression per patient tolerance, in order to prevent re-injury. 2. Patient will have a decrease in pain to facilitate improvement in movement, function, and ADLs as indicated by Functional Deficits.     Long Term Goals: To be achieved in: 6 weeks  1. Disability index score of 6% or less for the OSW  to assist with reaching prior level of function. 2. Patient will demonstrate increased AROM to WNL, good LS mobility, good hip ROM to allow for proper joint functioning as indicated by patients Functional Deficits. - progressing  3. Patient will demonstrate an increase in Strength to good proximal hip and core activation to allow for proper functional mobility as indicated by patients Functional Deficits. 4. Patient will return to work and functional activities with minimal increase symptoms or restriction. - Progressing  5. Patient will tolerate driving for 30-60 minutes without increase of pain to 3/10 or greater. - Progressing             New or Updated Goals (if applicable):  [x] No change to goals established upon initial eval/last progress note:  New Goals:    Progression Towards Functional goals:   [] Patient is progressing as expected towards functional goals listed. [x] Progression is slowed due to complexities listed. [] Progression has been slowed due to co-morbidities. [] Plan just implemented, too soon to assess goals progression  [] Other:     ASSESSMENT:    [] Improvement noted relative to goals:  [] No Improvement noted related to goals:  Summary/Patient's response to treatment:  Good response to TDN last session and multiple LTRs with good release this date.      Treatment/Activity Tolerance:  [x] Patient tolerated treatment well [] Patient limited

## 2018-12-03 ENCOUNTER — HOSPITAL ENCOUNTER (OUTPATIENT)
Dept: PHYSICAL THERAPY | Age: 33
Setting detail: THERAPIES SERIES
Discharge: HOME | End: 2018-12-03
Payer: COMMERCIAL

## 2018-12-03 PROCEDURE — 97140 MANUAL THERAPY 1/> REGIONS: CPT | Performed by: PHYSICAL THERAPIST

## 2018-12-03 PROCEDURE — 97032 APPL MODALITY 1+ESTIM EA 15: CPT | Performed by: PHYSICAL THERAPIST

## 2018-12-10 ENCOUNTER — HOSPITAL ENCOUNTER (OUTPATIENT)
Dept: PHYSICAL THERAPY | Age: 33
Setting detail: THERAPIES SERIES
Discharge: HOME | End: 2018-12-10
Payer: COMMERCIAL

## 2018-12-10 PROCEDURE — 97140 MANUAL THERAPY 1/> REGIONS: CPT | Performed by: PHYSICAL THERAPIST

## 2018-12-10 PROCEDURE — 97032 APPL MODALITY 1+ESTIM EA 15: CPT | Performed by: PHYSICAL THERAPIST

## 2018-12-10 NOTE — FLOWSHEET NOTE
[facility], [address] [place] [address]  Phone: [phone]  Fax [phone]    Physical Therapy Daily Treatment Note  Date:  12/10/2018    Patient Name:  Karime Lacy    :  1985  MRN: [id number]  Restrictions/Precautions:    Physician Information:  Referring Practitioner: Dr. Leonid Pagan  Medical/Treatment Diagnosis Information:  Diagnosis: Lumbar Facet Arthropathy M47.816; Left sided chronic LBP (M54.5)   · Treatment Diagnosis:LBP (M54.5)     [x] Conservative / [] Surgical - DOS:  Therapy Diagnosis/Practice Pattern:  Practice Pattern F: Spinal Disorders  Insurance/Certification information:  PT Insurance Information: MED [place]  - 50/25-1000D-MET-90/10-$0CP-PT/OT MED N[place]  Plan of care signed: [] YES  [x] NO  Number of Comorbidities:  []0     [x]1-2    []3+  Date of Patient follow up with Physician:     G-Code (if applicable):      Date G-Code Applied:  10/3/18  PT G-Codes  Functional Assessment Tool Used: Modified Oswestry  Score: 12%  Changing and Maintaining Body Position Current Status (): At least 1 percent but less than 20 percent impaired, limited or restricted  Changing and Maintaining Body Position Goal Status (): At least 1 percent but less than 20 percent impaired, limited or restricted    Progress Note: [x]  Yes  []  No  Next due by: Visit #10        Latex Allergy:  [x]NO      []YES  Preferred Language for Healthcare:   [x]English       []other:    Visit # Insurance Allowable Reporting Period   14 BMN Begin Date: 10/3/2018               End Date:      RECERT DUE BY: 6 weeks     SUBJECTIVE:    Patient reports that she is feeling much better since starting the needling. Glut pain and lower leg pain are significantly decreased. Most pain in back. See script in referral for dry needling and Media for consent form.       OBJECTIVE:    Observation: patient points to L PSIS as point of most pain

## 2018-12-10 NOTE — FLOWSHEET NOTE
[facility], [address] [place]  Phone: [phone]  Fax [phone]    ATHLETIC TRAINING [address]  Date:  12/10/2018    Patient Name:  Jone Philippe    :  1985  MRN: [id number]  Restrictions/Precautions:    Medical/Treatment Diagnosis Information:  ·  Lumbar facet arthropathy, L sided chronic LBP  ·  LBP  Physician Information:   Jaki Vazquez                                 Activity Log                                                               DOS/DOI:                                                             Date: 11/19/18 11/28/18 12/3/18 12/10/18   ATC Commun: Hx of laminectomy Dec 2017, had a baby in April/May 2017, L sided symptoms/weakness Doing better, going longer streches without pain during the day. Short on time today Short on time today     Bike       Airdyne       Elliptical       Treadmill              Hamstring stretch       Quad stretch       Hip flexor stretch       Piriformis stretch       Gastroc stretch       Soleus stretch              Leg press  Bilat. Unilat. Knee ext. Knee flex. Squats   Mini                      Wall                      BOSU              Step   Up                  Up and Over                  Lat. Down                         [facility]  Flex. SLS on BOSU, 3-way hip R/L 10x ea, TrA stab SLS on BOSU, 3-way hip R/L 10x ea, TrA stab SLS on BOSU, 3-way hip R/L 10x ea, TrA stab              ABd                 ADd                 Ext              Cable Column/Theraband    Rows                                                     Ext. Lat. pulldown                                                     Chops                                                     Trunk Rot.               Reformer TrA/BKFO 10x 10x 10x 10x   Reformer FW Parallel Toes

## 2018-12-17 ENCOUNTER — HOSPITAL ENCOUNTER (OUTPATIENT)
Dept: PHYSICAL THERAPY | Age: 33
Setting detail: THERAPIES SERIES
Discharge: HOME | End: 2018-12-17
Payer: COMMERCIAL

## 2018-12-17 PROCEDURE — 97140 MANUAL THERAPY 1/> REGIONS: CPT | Performed by: PHYSICAL THERAPIST

## 2018-12-17 PROCEDURE — 97032 APPL MODALITY 1+ESTIM EA 15: CPT | Performed by: PHYSICAL THERAPIST

## 2018-12-17 NOTE — FLOWSHEET NOTE
exercise     Therapist goals for Patient:   Short Term Goals: To be achieved in: 2 weeks  1. Independent in HEP and progression per patient tolerance, in order to prevent re-injury. 2. Patient will have a decrease in pain to facilitate improvement in movement, function, and ADLs as indicated by Functional Deficits.     Long Term Goals: To be achieved in: 6 weeks  1. Disability index score of 6% or less for the OSW  to assist with reaching prior level of function. 2. Patient will demonstrate increased AROM to WNL, good LS mobility, good hip ROM to allow for proper joint functioning as indicated by patients Functional Deficits. - progressing  3. Patient will demonstrate an increase in Strength to good proximal hip and core activation to allow for proper functional mobility as indicated by patients Functional Deficits. 4. Patient will return to work and functional activities with minimal increase symptoms or restriction. - Progressing  5. Patient will tolerate driving for 70-78 minutes without increase of pain to 3/10 or greater. - Progressing             New or Updated Goals (if applicable):  [x] No change to goals established upon initial eval/last progress note:  New Goals:    Progression Towards Functional goals:   [] Patient is progressing as expected towards functional goals listed. [x] Progression is slowed due to complexities listed. [] Progression has been slowed due to co-morbidities. [] Plan just implemented, too soon to assess goals progression  [] Other:     ASSESSMENT:    [] Improvement noted relative to goals:  [] No Improvement noted related to goals:  Summary/Patient's response to treatment: Patient responded well to TDN to multifidus bilaterally. Reported feeling much better with prayer stretch immediately following, which is typically the motion she feels most restricted. Will continue to assess NV.     Treatment/Activity Tolerance:  [x] Patient tolerated treatment well [] Patient

## 2018-12-28 ENCOUNTER — HOSPITAL ENCOUNTER (OUTPATIENT)
Dept: PHYSICAL THERAPY | Age: 33
Setting detail: THERAPIES SERIES
Discharge: HOME | End: 2018-12-28
Payer: COMMERCIAL

## 2018-12-28 PROCEDURE — 97140 MANUAL THERAPY 1/> REGIONS: CPT | Performed by: PHYSICAL THERAPIST

## 2018-12-28 PROCEDURE — G8983 BODY POS D/C STATUS: HCPCS | Performed by: PHYSICAL THERAPIST

## 2018-12-28 PROCEDURE — G8982 BODY POS GOAL STATUS: HCPCS | Performed by: PHYSICAL THERAPIST

## 2018-12-28 PROCEDURE — G8981 BODY POS CURRENT STATUS: HCPCS | Performed by: PHYSICAL THERAPIST

## 2018-12-28 PROCEDURE — 97032 APPL MODALITY 1+ESTIM EA 15: CPT | Performed by: PHYSICAL THERAPIST

## 2018-12-28 NOTE — FLOWSHEET NOTE
[facility], [address] [place] [address]  Phone: [phone]  Fax [phone]    Physical Therapy Daily Treatment Note  Date:  2018    Patient Name:  Danelle Thapa    :  1985  MRN: [id number]  Restrictions/Precautions:    Physician Information:  Referring Practitioner: Dr. Frank Peters  Medical/Treatment Diagnosis Information:  Diagnosis: Lumbar Facet Arthropathy M47.816; Left sided chronic LBP (M54.5)   · Treatment Diagnosis:LBP (M54.5)     [x] Conservative / [] Surgical - DOS:  Therapy Diagnosis/Practice Pattern:  Practice Pattern F: Spinal Disorders  Insurance/Certification information:  PT Insurance Information: MED [place]  - 50/25-1000D-MET-90/10-$0CP-PT/OT MED [facility]  Plan of care signed: [] YES  [x] NO  Number of Comorbidities:  []0     [x]1-2    []3+  Date of Patient follow up with Physician:     G-Code (if applicable):      Date G-Code Applied:  10/3/18  PT G-Codes  Functional Assessment Tool Used: Modified Oswestry  Score: 12%  Changing and Maintaining Body Position Current Status (): At least 1 percent but less than 20 percent impaired, limited or restricted  Changing and Maintaining Body Position Goal Status (): At least 1 percent but less than 20 percent impaired, limited or restricted    Progress Note: [x]  Yes  []  No  Next due by: Visit #10        Latex Allergy:  [x]NO      []YES  Preferred Language for Healthcare:   [x]English       []other:    Visit # Insurance Allowable Reporting Period   16 BMN Begin Date: 10/3/2018               End Date:      RECERT DUE BY: 6 weeks     SUBJECTIVE:    Patient reports that the needling to her multifidus really helped. Felt \"normal\" for about a day.       OBJECTIVE:    Observation: patient points to L PSIS as point of most pain   Palpation: most palpable tightness in glut med     Test used Initial score Current Score   Pain Summary VAS 3-4/10 0-1/10 in back Functional questionnaire OSW 12% 10   ROM Lumbar Flexion 75% 100%    Lumbar Ext 50% 100%    Lumbar SB L/R 50% 100%    Lumbar rotation L/R     Strength DF 4/5 4+/5    ABD 3+ left 4+/5    TrA       RESTRICTIONS/PRECAUTIONS: L4-L5 Discectomy / Laminotomy December 2017; Per PA, it is okay to needle patient's multifidus as patient had a laminotomy versus a laminectomy    Exercises/Interventions: All dry needling performed by Peri Lozano PT, DPT [id number]    Consent signed 11/21/18 and precautions addressed. See media tab. Muscle  Needle Size Technique Notes IES   Site 1  0.30 x 60mm [x] Pistoning / []  Threading  []  Winding/Coning  []    Site 2 L4,5 Multifidus bilaterally 0.35 x 75mm [] Pistoning / []  Threading  []  Winding/Coning More painful L side this date [x]    Site 3 Glut Med x 2 0.40 x 100mm [x] Pistoning / []  Threading  []  Winding/Coning  [x]    Site 4 Gut Min x 1 0.40 x 100mm [x] Pistoning / []  Threading  []  Winding/Coning  [x]    Site 5  0.40 x 100mm [] Pistoning / []  Threading  []  Winding/Coning  []    Site 6                    [] Pistoning / []  Threading  []  Winding/Coning  []    Site 7                    [] Pistoning / []  Threading  []  Winding/Coning  []    Site 8                    [] Pistoning / []  Threading  []  Winding/Coning  []    Site 9                    [] Pistoning / []  Threading  []  Winding/Coning  []    Site 10                    [] Pistoning / []  Threading  []  Winding/Coning  []      **The above techniques were used to restore functional range of motion, reduce muscle spasm, and induce healing in the corresponding musculature by means of intramuscular mobilization. Clean Technique was utilized today while applying the Dry needling treatment. The treatment sites where cleaned with 70% solution of isopropyl alcohol.  The PT washed their hands and utilized treatment gloves along with hand  prior to inserting the needles.  All needles where removed and discarded in

## 2019-02-15 RX ORDER — SULFACETAMIDE SODIUM 100 MG/ML
SOLUTION/ DROPS OPHTHALMIC
Qty: 10 ML | Refills: 0 | Status: SHIPPED | OUTPATIENT
Start: 2019-02-15 | End: 2019-04-15 | Stop reason: ALTCHOICE

## 2019-04-09 ENCOUNTER — TELEPHONE (OUTPATIENT)
Dept: INTERNAL MEDICINE CLINIC | Age: 34
End: 2019-04-09

## 2019-04-09 DIAGNOSIS — Z00.00 WELLNESS EXAMINATION: ICD-10-CM

## 2019-04-09 DIAGNOSIS — D50.9 IRON DEFICIENCY ANEMIA, UNSPECIFIED IRON DEFICIENCY ANEMIA TYPE: ICD-10-CM

## 2019-04-09 DIAGNOSIS — Z13.220 SCREENING CHOLESTEROL LEVEL: Primary | ICD-10-CM

## 2019-04-09 NOTE — TELEPHONE ENCOUNTER
Pt has a cpe on 4-15 and she needs orders placed in the system. Pt will get the labs drawn at the [facility] center lab.

## 2019-04-12 DIAGNOSIS — Z00.00 WELLNESS EXAMINATION: ICD-10-CM

## 2019-04-12 DIAGNOSIS — D50.9 IRON DEFICIENCY ANEMIA, UNSPECIFIED IRON DEFICIENCY ANEMIA TYPE: ICD-10-CM

## 2019-04-12 DIAGNOSIS — Z13.220 SCREENING CHOLESTEROL LEVEL: ICD-10-CM

## 2019-04-12 LAB
ANION GAP SERPL CALCULATED.3IONS-SCNC: 15 MMOL/L (ref 3–16)
BUN BLDV-MCNC: 8 MG/DL (ref 7–20)
CALCIUM SERPL-MCNC: 9 MG/DL (ref 8.3–10.6)
CHLORIDE BLD-SCNC: 105 MMOL/L (ref 99–110)
CHOLESTEROL, TOTAL: 131 MG/DL (ref 0–199)
CO2: 23 MMOL/L (ref 21–32)
CREAT SERPL-MCNC: 0.6 MG/DL (ref 0.6–1.1)
FERRITIN: 29 NG/ML (ref 15–150)
GFR AFRICAN AMERICAN: >60
GFR NON-AFRICAN AMERICAN: >60
GLUCOSE BLD-MCNC: 88 MG/DL (ref 70–99)
HCT VFR BLD CALC: 41.3 % (ref 36–48)
HDLC SERPL-MCNC: 37 MG/DL (ref 40–60)
HEMOGLOBIN: 13.9 G/DL (ref 12–16)
IRON SATURATION: 37 % (ref 15–50)
IRON: 104 UG/DL (ref 37–145)
LDL CHOLESTEROL CALCULATED: 80 MG/DL
MCH RBC QN AUTO: 29.6 PG (ref 26–34)
MCHC RBC AUTO-ENTMCNC: 33.6 G/DL (ref 31–36)
MCV RBC AUTO: 88.2 FL (ref 80–100)
PDW BLD-RTO: 12.7 % (ref 12.4–15.4)
PLATELET # BLD: 173 K/UL (ref 135–450)
PMV BLD AUTO: 7.8 FL (ref 5–10.5)
POTASSIUM SERPL-SCNC: 4.5 MMOL/L (ref 3.5–5.1)
RBC # BLD: 4.69 M/UL (ref 4–5.2)
SODIUM BLD-SCNC: 143 MMOL/L (ref 136–145)
TOTAL IRON BINDING CAPACITY: 281 UG/DL (ref 260–445)
TRIGL SERPL-MCNC: 68 MG/DL (ref 0–150)
VLDLC SERPL CALC-MCNC: 14 MG/DL
WBC # BLD: 6.1 K/UL (ref 4–11)

## 2019-04-15 ENCOUNTER — OFFICE VISIT (OUTPATIENT)
Dept: INTERNAL MEDICINE CLINIC | Age: 34
End: 2019-04-15
Payer: COMMERCIAL

## 2019-04-15 VITALS
SYSTOLIC BLOOD PRESSURE: 110 MMHG | DIASTOLIC BLOOD PRESSURE: 70 MMHG | TEMPERATURE: 98.2 F | OXYGEN SATURATION: 99 % | HEART RATE: 67 BPM | WEIGHT: 192.8 LBS | HEIGHT: 68 IN | BODY MASS INDEX: 29.22 KG/M2

## 2019-04-15 DIAGNOSIS — Z00.00 WELLNESS EXAMINATION: Primary | ICD-10-CM

## 2019-04-15 PROCEDURE — 99395 PREV VISIT EST AGE 18-39: CPT | Performed by: INTERNAL MEDICINE

## 2019-04-15 ASSESSMENT — PATIENT HEALTH QUESTIONNAIRE - PHQ9
SUM OF ALL RESPONSES TO PHQ QUESTIONS 1-9: 0
2. FEELING DOWN, DEPRESSED OR HOPELESS: 0
1. LITTLE INTEREST OR PLEASURE IN DOING THINGS: 0
SUM OF ALL RESPONSES TO PHQ9 QUESTIONS 1 & 2: 0
SUM OF ALL RESPONSES TO PHQ QUESTIONS 1-9: 0

## 2019-04-15 ASSESSMENT — ENCOUNTER SYMPTOMS
COUGH: 0
ABDOMINAL PAIN: 0
SINUS PRESSURE: 0
ROS SKIN COMMENTS: NO CONCERNING SKIN LESIONS
BACK PAIN: 1
BLOOD IN STOOL: 0
VOMITING: 0
NAUSEA: 0
SHORTNESS OF BREATH: 0
CONSTIPATION: 0
DIARRHEA: 0

## 2019-04-15 NOTE — PROGRESS NOTES
SUBJECTIVE:  Crystal Murphy is a 36 y.o. female being evaluated for:    Chief Complaint   Patient presents with    Annual Exam     employee physical/review labs       HPI   Here for annual wellness  Feeling well No complaints or concerns  Has lost nearly 20 # wiith diet and exercize since xmas time    No Known Allergies  Current Outpatient Medications   Medication Sig Dispense Refill    Multiple Vitamin (MULTI-DAY PO) Take by mouth daily      Levonorgestrel-Ethinyl Estrad (AVIANE PO) Take by mouth       No current facility-administered medications for this visit.           Social History     Socioeconomic History    Marital status:      Spouse name: Not on file    Number of children: Not on file    Years of education: Not on file    Highest education level: Not on file   Occupational History    Not on file   Social Needs    Financial resource strain: Not on file    Food insecurity:     Worry: Not on file     Inability: Not on file    Transportation needs:     Medical: Not on file     Non-medical: Not on file   Tobacco Use    Smoking status: Never Smoker    Smokeless tobacco: Never Used   Substance and Sexual Activity    Alcohol use: No    Drug use: No    Sexual activity: Yes     Partners: Male   Lifestyle    Physical activity:     Days per week: Not on file     Minutes per session: Not on file    Stress: Not on file   Relationships    Social connections:     Talks on phone: Not on file     Gets together: Not on file     Attends [employer] service: Not on file     Active member of club or organization: Not on file     Attends meetings of clubs or organizations: Not on file     Relationship status: Not on file    Intimate partner violence:     Fear of current or ex partner: Not on file     Emotionally abused: Not on file     Physically abused: Not on file     Forced sexual activity: Not on file   Other Topics Concern    Not on file   Social History Narrative    Not on file      Past Medical and headaches. No further paresthesias    Psychiatric/Behavioral: Negative for dysphoric mood and sleep disturbance. OBJECTIVE:  /70   Pulse 67   Temp 98.2 °F (36.8 °C) (Oral)   Ht 5' 8\" (1.727 m)   Wt 192 lb 12.8 oz (87.5 kg)   LMP 03/25/2019 (Approximate)   SpO2 99%   Breastfeeding? No   BMI 29.32 kg/m²      Body mass index is 29.32 kg/m². Physical Exam   Constitutional: She is oriented to person, place, and time. She appears well-developed and well-nourished. No distress. HENT:   Head: Normocephalic and atraumatic. Right Ear: External ear normal.   Left Ear: External ear normal.   Mouth/Throat: Oropharynx is clear and moist.   Tympanic membranes are clear   Eyes: Conjunctivae are normal.   Neck: Neck supple. No thyromegaly present. Cardiovascular: Normal rate, regular rhythm and normal heart sounds. Exam reveals no gallop and no friction rub. No murmur heard. Pulmonary/Chest: Effort normal and breath sounds normal. She exhibits no tenderness. Abdominal: Soft. She exhibits no distension. There is no tenderness. No HSM   Musculoskeletal: She exhibits no edema. Lymphadenopathy:     She has no cervical adenopathy. Neurological: She is alert and oriented to person, place, and time. Coordination normal.   Skin: Skin is warm and dry. No rash noted. Psychiatric: She has a normal mood and affect. Her behavior is normal.   Nursing note and vitals reviewed. ASSESSMENT/PLAN:    [facility] was seen today for annual exam.    Diagnoses and all orders for this visit:    Wellness examination  Recent labs with cholesterol 137 LDL of 80. Normal glucose at 88      No orders of the defined types were placed in this encounter. Return in about 1 year (around 4/15/2020), or if symptoms worsen or fail to improve. There are no Patient Instructions on file for this visit.

## 2020-01-14 RX ORDER — SULFACETAMIDE SODIUM 100 MG/ML
2 SOLUTION/ DROPS OPHTHALMIC EVERY 4 HOURS
Qty: 15 ML | Refills: 0 | Status: SHIPPED | OUTPATIENT
Start: 2020-01-14 | End: 2020-01-21

## 2020-02-13 RX ORDER — METHYLPREDNISOLONE 4 MG/1
TABLET ORAL
Qty: 1 KIT | Refills: 0 | Status: SHIPPED | OUTPATIENT
Start: 2020-02-13 | End: 2020-02-19

## 2020-08-19 RX ORDER — PREDNISONE 20 MG/1
TABLET ORAL
Qty: 20 TABLET | Refills: 0 | Status: SHIPPED | OUTPATIENT
Start: 2020-08-19 | End: 2020-12-14

## 2020-12-14 ENCOUNTER — OFFICE VISIT (OUTPATIENT)
Dept: FAMILY MEDICINE CLINIC | Age: 35
End: 2020-12-14
Payer: COMMERCIAL

## 2020-12-14 VITALS
TEMPERATURE: 97.4 F | RESPIRATION RATE: 16 BRPM | DIASTOLIC BLOOD PRESSURE: 60 MMHG | OXYGEN SATURATION: 97 % | HEIGHT: 68 IN | BODY MASS INDEX: 25.13 KG/M2 | HEART RATE: 74 BPM | WEIGHT: 165.8 LBS | SYSTOLIC BLOOD PRESSURE: 106 MMHG

## 2020-12-14 PROCEDURE — 99395 PREV VISIT EST AGE 18-39: CPT | Performed by: INTERNAL MEDICINE

## 2020-12-14 RX ORDER — FLUTICASONE PROPIONATE 50 MCG
1 SPRAY, SUSPENSION (ML) NASAL DAILY
Qty: 1 BOTTLE | Refills: 2 | COMMUNITY
Start: 2020-12-14

## 2020-12-14 RX ORDER — LEVONORGESTREL AND ETHINYL ESTRADIOL 0.1-0.02MG
1 KIT ORAL
COMMUNITY
Start: 2020-12-03 | End: 2021-11-28

## 2020-12-14 ASSESSMENT — PATIENT HEALTH QUESTIONNAIRE - PHQ9
SUM OF ALL RESPONSES TO PHQ9 QUESTIONS 1 & 2: 0
SUM OF ALL RESPONSES TO PHQ QUESTIONS 1-9: 0
1. LITTLE INTEREST OR PLEASURE IN DOING THINGS: 0
2. FEELING DOWN, DEPRESSED OR HOPELESS: 0

## 2020-12-14 ASSESSMENT — ENCOUNTER SYMPTOMS
DIARRHEA: 0
ROS SKIN COMMENTS: NO CONCERNING SKIN LESIONS
NAUSEA: 0
VOMITING: 0
SHORTNESS OF BREATH: 0
TROUBLE SWALLOWING: 0
SINUS PRESSURE: 0
COUGH: 0
BACK PAIN: 0
BLOOD IN STOOL: 0
ABDOMINAL PAIN: 0
CONSTIPATION: 0

## 2020-12-14 NOTE — PROGRESS NOTES
SUBJECTIVE:  Lucillie Krabbe is a 30 y.o. female being evaluated for:    Chief Complaint   Patient presents with    Annual Exam       HPI   Here physical  Problems with aching in left ear random  No colds or sinus problems  No trouble hearing or dizziness    Losing weight with diet ex down 35 #   No Known Allergies  Current Outpatient Medications   Medication Sig Dispense Refill    levonorgestrel-ethinyl estradiol (AVIANE) 0.1-20 MG-MCG per tablet 1 tablet      fluticasone (FLONASE) 50 MCG/ACT nasal spray 1 spray by Each Nostril route daily 1 Bottle 2     No current facility-administered medications for this visit.           Social History     Socioeconomic History    Marital status:      Spouse name: Not on file    Number of children: Not on file    Years of education: Not on file    Highest education level: Not on file   Occupational History    Not on file   Social Needs    Financial resource strain: Not on file    Food insecurity     Worry: Not on file     Inability: Not on file    Transportation needs     Medical: Not on file     Non-medical: Not on file   Tobacco Use    Smoking status: Never Smoker    Smokeless tobacco: Never Used   Substance and Sexual Activity    Alcohol use: No    Drug use: No    Sexual activity: Yes     Partners: Male   Lifestyle    Physical activity     Days per week: Not on file     Minutes per session: Not on file    Stress: Not on file   Relationships    Social connections     Talks on phone: Not on file     Gets together: Not on file     Attends Rastafari service: Not on file     Active member of club or organization: Not on file     Attends meetings of clubs or organizations: Not on file     Relationship status: Not on file    Intimate partner violence     Fear of current or ex partner: Not on file     Emotionally abused: Not on file     Physically abused: Not on file     Forced sexual activity: Not on file   Other Topics Concern    Not on file   Social History Narrative    Not on file      Past Medical History:   Diagnosis Date    Contact lens/glasses fitting      (spontaneous vaginal delivery)      Past Surgical History:   Procedure Laterality Date    BACK SURGERY  2017    micro lumbar laminectomy L-4-L-5    HEMORRHOID SURGERY      LUMBAR DISCECTOMY      CO NJX DX/THER SBST INTRLMNR CRV/THRC W/IMG GDN Left 10/2/2018    LEFT LUMBAR THREE, LUMBAR FOUR, LUMBAR FIVE MEDIAL BRANCH BLOCK SITE CONFIRMED BY FLUOROSCOPY performed by Reginaldo Sanchez MD at [facility] DX/THER SBST INTRLMNR CRV/THRC W/IMG GDN Left 10/22/2018    LEFT LUMBAR THREE, LUMBAR FOUR, LUMBAR FIVE RADIOFREQUENCY SITE CONFIRMED BY FLUOROSCOPY performed by Reginaldo Sanchez MD at [address] [place] [address]  3/2010    WISDOM TOOTH EXTRACTION  2004     Family History   Problem Relation Age of Onset    Diabetes Maternal Grandmother     Other Maternal Grandmother         Pulmonary embolism     Cancer Maternal Grandfather         bladder    Cancer Mother         sacroma leg    Allergy (Severe) Mother     Arthritis Mother     Allergy (Severe) Sister     Asthma Brother     Learning Disabilities Maternal Cousin        Review of Systems   Constitutional: Positive for unexpected weight change (working at it down 35#). Negative for activity change and fatigue. HENT: Positive for ear pain. Negative for congestion, hearing loss, nosebleeds, sinus pressure and trouble swallowing. Eyes: Negative for visual disturbance. Respiratory: Negative for cough and shortness of breath. Cardiovascular: Negative for chest pain, palpitations and leg swelling. Gastrointestinal: Negative for abdominal pain, blood in stool, constipation, diarrhea, nausea and vomiting. Endocrine:        Self breast exams negative    Genitourinary: Negative for dysuria and menstrual problem (Last   Not pregnant and all is normal ).    Musculoskeletal: Negative for arthralgias and back pain. Skin:        No concerning skin lesions   Allergic/Immunologic: Negative for environmental allergies. Neurological: Negative for dizziness, light-headedness and headaches. Psychiatric/Behavioral: Negative for dysphoric mood and sleep disturbance. OBJECTIVE:  /60 (Site: Left Upper Arm, Position: Sitting, Cuff Size: Medium Adult)   Pulse 74   Temp 97.4 °F (36.3 °C) (Oral)   Resp 16   Ht 5' 7.75\" (1.721 m) Comment: without shoes  Wt 165 lb 12.8 oz (75.2 kg)   LMP 11/29/2020 (Exact Date)   SpO2 97%   BMI 25.40 kg/m²      Body mass index is 25.4 kg/m². Physical Exam  Vitals signs and nursing note reviewed. Constitutional:       General: She is not in acute distress. Appearance: Normal appearance. She is well-developed. HENT:      Head: Normocephalic and atraumatic. Right Ear: Tympanic membrane, ear canal and external ear normal.      Left Ear: Tympanic membrane, ear canal and external ear normal.      Mouth/Throat:      Pharynx: Oropharynx is clear. Eyes:      Conjunctiva/sclera: Conjunctivae normal.   Neck:      Musculoskeletal: Neck supple. No muscular tenderness. Thyroid: No thyromegaly. Vascular: No carotid bruit. Cardiovascular:      Rate and Rhythm: Normal rate and regular rhythm. Heart sounds: Normal heart sounds. No murmur. No friction rub. No gallop. Pulmonary:      Effort: Pulmonary effort is normal.      Breath sounds: Normal breath sounds. Chest:      Chest wall: No tenderness. Abdominal:      General: There is no distension. Palpations: Abdomen is soft. Tenderness: There is no abdominal tenderness. Comments: No HSM   Musculoskeletal:         General: No swelling. Lymphadenopathy:      Cervical: No cervical adenopathy. Skin:     General: Skin is warm and dry. Findings: No rash. Neurological:      General: No focal deficit present. Mental Status: She is alert.       Coordination: Coordination normal.      Gait: Gait normal.   Psychiatric:         Behavior: Behavior normal.         Thought Content: Thought content normal.         ASSESSMENT/PLAN:    Arben Barrera was seen today for annual exam.    Diagnoses and all orders for this visit:    Physical exam hm up to date     Left ear pain treat for eustachian tube dysfunction   -     fluticasone (FLONASE) 50 MCG/ACT nasal spray; 1 spray by Each Nostril route daily        Orders Placed This Encounter   Medications    fluticasone (FLONASE) 50 MCG/ACT nasal spray     Si spray by Each Nostril route daily     Dispense:  1 Bottle     Refill:  2        Return in about 1 year (around 2021), or if symptoms worsen or fail to improve, for physical exam .     There are no Patient Instructions on file for this visit.

## 2020-12-14 NOTE — PATIENT INSTRUCTIONS

## 2021-06-18 LAB
CHOLESTEROL, TOTAL: 131 MG/DL (ref 0–199)
GLUCOSE BLD-MCNC: 85 MG/DL (ref 70–99)
HDLC SERPL-MCNC: 48 MG/DL (ref 40–60)
LDL CHOLESTEROL CALCULATED: 75 MG/DL
TRIGL SERPL-MCNC: 41 MG/DL (ref 0–150)

## 2021-09-07 ENCOUNTER — OFFICE VISIT (OUTPATIENT)
Dept: FAMILY MEDICINE CLINIC | Age: 36
End: 2021-09-07
Payer: COMMERCIAL

## 2021-09-07 VITALS
TEMPERATURE: 98.2 F | SYSTOLIC BLOOD PRESSURE: 106 MMHG | OXYGEN SATURATION: 99 % | WEIGHT: 166.4 LBS | HEIGHT: 67 IN | HEART RATE: 57 BPM | DIASTOLIC BLOOD PRESSURE: 60 MMHG | BODY MASS INDEX: 26.12 KG/M2

## 2021-09-07 DIAGNOSIS — H66.002 NON-RECURRENT ACUTE SUPPURATIVE OTITIS MEDIA OF LEFT EAR WITHOUT SPONTANEOUS RUPTURE OF TYMPANIC MEMBRANE: Primary | ICD-10-CM

## 2021-09-07 PROCEDURE — 99213 OFFICE O/P EST LOW 20 MIN: CPT | Performed by: INTERNAL MEDICINE

## 2021-09-07 RX ORDER — AMOXICILLIN 875 MG/1
875 TABLET, COATED ORAL 2 TIMES DAILY
Qty: 20 TABLET | Refills: 0 | Status: SHIPPED | OUTPATIENT
Start: 2021-09-07 | End: 2021-09-20 | Stop reason: ALTCHOICE

## 2021-09-07 SDOH — ECONOMIC STABILITY: TRANSPORTATION INSECURITY
IN THE PAST 12 MONTHS, HAS LACK OF TRANSPORTATION KEPT YOU FROM MEETINGS, WORK, OR FROM GETTING THINGS NEEDED FOR DAILY LIVING?: NO

## 2021-09-07 SDOH — ECONOMIC STABILITY: FOOD INSECURITY: WITHIN THE PAST 12 MONTHS, YOU WORRIED THAT YOUR FOOD WOULD RUN OUT BEFORE YOU GOT MONEY TO BUY MORE.: NEVER TRUE

## 2021-09-07 SDOH — ECONOMIC STABILITY: FOOD INSECURITY: WITHIN THE PAST 12 MONTHS, THE FOOD YOU BOUGHT JUST DIDN'T LAST AND YOU DIDN'T HAVE MONEY TO GET MORE.: NEVER TRUE

## 2021-09-07 SDOH — ECONOMIC STABILITY: TRANSPORTATION INSECURITY
IN THE PAST 12 MONTHS, HAS THE LACK OF TRANSPORTATION KEPT YOU FROM MEDICAL APPOINTMENTS OR FROM GETTING MEDICATIONS?: NO

## 2021-09-07 ASSESSMENT — SOCIAL DETERMINANTS OF HEALTH (SDOH): HOW HARD IS IT FOR YOU TO PAY FOR THE VERY BASICS LIKE FOOD, HOUSING, MEDICAL CARE, AND HEATING?: NOT HARD AT ALL

## 2021-09-07 ASSESSMENT — PATIENT HEALTH QUESTIONNAIRE - PHQ9
SUM OF ALL RESPONSES TO PHQ QUESTIONS 1-9: 0
2. FEELING DOWN, DEPRESSED OR HOPELESS: 0
SUM OF ALL RESPONSES TO PHQ QUESTIONS 1-9: 0
SUM OF ALL RESPONSES TO PHQ QUESTIONS 1-9: 0
1. LITTLE INTEREST OR PLEASURE IN DOING THINGS: 0
SUM OF ALL RESPONSES TO PHQ9 QUESTIONS 1 & 2: 0

## 2021-09-07 NOTE — PROGRESS NOTES
SUBJECTIVE:  Germán Walters is a 29 y.o. female being evaluated for:    Chief Complaint   Patient presents with    Leg Pain      Pt have pressure pain on the left ear and its been going on since few weeks. HPI Trouble with left ear pain for 2 weeks  Feels like has fluid in it  Pressure achy underwater feeling  Echo in her voice  No colds or sinus problem   No head congestion sore throat  No fevers or chills   Tried flonase antihistamines and mucinex d not helping  No swimming  No Dizziness   Not pregnant on birth control pills      No Known Allergies  Current Outpatient Medications   Medication Sig Dispense Refill    amoxicillin (AMOXIL) 875 MG tablet Take 1 tablet by mouth 2 times daily for 10 days 20 tablet 0    levonorgestrel-ethinyl estradiol (AVIANE) 0.1-20 MG-MCG per tablet 1 tablet      fluticasone (FLONASE) 50 MCG/ACT nasal spray 1 spray by Each Nostril route daily 1 Bottle 2     No current facility-administered medications for this visit. Social History     Socioeconomic History    Marital status:      Spouse name: Not on file    Number of children: Not on file    Years of education: Not on file    Highest education level: Not on file   Occupational History    Not on file   Tobacco Use    Smoking status: Never Smoker    Smokeless tobacco: Never Used   Vaping Use    Vaping Use: Never used   Substance and Sexual Activity    Alcohol use: No    Drug use: No    Sexual activity: Yes     Partners: Male   Other Topics Concern    Not on file   Social History Narrative    Not on file     Social Determinants of Health     Financial Resource Strain: Low Risk     Difficulty of Paying Living Expenses: Not hard at all   Food Insecurity: No Food Insecurity    Worried About [address] in the Last Year: Never true    Percy of Food in the Last Year: Never true   Transportation Needs: No Transportation Needs    Lack of Transportation (Medical):  No    Lack of Transportation (Non-Medical): No   Physical Activity:     Days of Exercise per Week:     Minutes of Exercise per Session:    Stress:     Feeling of Stress :    Social Connections:     Frequency of Communication with Friends and Family:     Frequency of Social Gatherings with Friends and Family:     Attends Buddhist Services:     Active Member of Clubs or Organizations:     Attends Club or Organization Meetings:     Marital Status:    Intimate Partner Violence:     Fear of Current or Ex-Partner:     Emotionally Abused:     Physically Abused:     Sexually Abused:       Past Medical History:   Diagnosis Date    Contact lens/glasses fitting      (spontaneous vaginal delivery)      Past Surgical History:   Procedure Laterality Date    BACK SURGERY  2017    micro lumbar laminectomy L-4-L-5    HEMORRHOID SURGERY      LUMBAR DISCECTOMY      [place] NJX DX/THER SBST INTRLMNR CRV/THRC W/IMG GDN Left 10/2/2018    LEFT LUMBAR THREE, LUMBAR FOUR, LUMBAR FIVE MEDIAL BRANCH BLOCK SITE CONFIRMED BY FLUOROSCOPY performed by Connie Aceves MD at [facility] DX/THER SBST INTRLMNR CRV/THRC W/IMG GDN Left 10/22/2018    LEFT LUMBAR THREE, LUMBAR FOUR, LUMBAR FIVE RADIOFREQUENCY SITE CONFIRMED BY FLUOROSCOPY performed by Connie Aceves MD at [address] [place] [address]  3/2010    [address] EXTRACTION  2004       Review of Systems   Constitutional: Negative for chills and fever. HENT: Positive for ear pain and hearing loss. Negative for congestion, sinus pressure and sore throat. Respiratory: Negative for cough and shortness of breath. Cardiovascular: Negative for chest pain and palpitations. Gastrointestinal: Negative for abdominal pain, diarrhea, nausea and vomiting. Genitourinary: Negative for menstrual problem (Pregnant, not). Musculoskeletal: Negative for myalgias. Neurological: Negative for dizziness, light-headedness and headaches.        OBJECTIVE:  /60 (Site: days        Orders Placed This Encounter   Medications    amoxicillin (AMOXIL) 875 MG tablet     Sig: Take 1 tablet by mouth 2 times daily for 10 days     Dispense:  20 tablet     Refill:  0        Return if symptoms worsen or fail to improve. There are no Patient Instructions on file for this visit.

## 2021-09-17 ASSESSMENT — ENCOUNTER SYMPTOMS
SORE THROAT: 0
DIARRHEA: 0
VOMITING: 0
SINUS PRESSURE: 0
ABDOMINAL PAIN: 0
COUGH: 0
SHORTNESS OF BREATH: 0
NAUSEA: 0

## 2021-09-20 RX ORDER — AMOXICILLIN AND CLAVULANATE POTASSIUM 875; 125 MG/1; MG/1
1 TABLET, FILM COATED ORAL 2 TIMES DAILY
Qty: 14 TABLET | Refills: 0 | Status: SHIPPED | OUTPATIENT
Start: 2021-09-20 | End: 2021-09-27

## 2022-04-08 LAB
CHOLESTEROL, TOTAL: 121 MG/DL (ref 0–199)
GLUCOSE BLD-MCNC: 89 MG/DL (ref 70–99)
HDLC SERPL-MCNC: 35 MG/DL (ref 40–60)
LDL CHOLESTEROL CALCULATED: 74 MG/DL
TRIGL SERPL-MCNC: 59 MG/DL (ref 0–150)

## 2023-01-20 ENCOUNTER — OFFICE VISIT (OUTPATIENT)
Dept: FAMILY MEDICINE CLINIC | Age: 38
End: 2023-01-20
Payer: COMMERCIAL

## 2023-01-20 VITALS
SYSTOLIC BLOOD PRESSURE: 118 MMHG | TEMPERATURE: 98.1 F | HEIGHT: 67 IN | WEIGHT: 165 LBS | BODY MASS INDEX: 25.9 KG/M2 | OXYGEN SATURATION: 99 % | HEART RATE: 70 BPM | DIASTOLIC BLOOD PRESSURE: 62 MMHG

## 2023-01-20 DIAGNOSIS — B96.89 ACUTE BACTERIAL SINUSITIS: Primary | ICD-10-CM

## 2023-01-20 DIAGNOSIS — R05.9 COUGH, UNSPECIFIED TYPE: ICD-10-CM

## 2023-01-20 DIAGNOSIS — J01.90 ACUTE BACTERIAL SINUSITIS: Primary | ICD-10-CM

## 2023-01-20 PROCEDURE — 99213 OFFICE O/P EST LOW 20 MIN: CPT | Performed by: NURSE PRACTITIONER

## 2023-01-20 RX ORDER — METHYLPREDNISOLONE 4 MG/1
TABLET ORAL
Qty: 1 KIT | Refills: 0 | Status: SHIPPED | OUTPATIENT
Start: 2023-01-20 | End: 2023-01-26

## 2023-01-20 RX ORDER — CEFUROXIME AXETIL 250 MG/1
250 TABLET ORAL 2 TIMES DAILY
Qty: 14 TABLET | Refills: 0 | Status: SHIPPED | OUTPATIENT
Start: 2023-01-20 | End: 2023-01-27

## 2023-01-20 RX ORDER — BENZONATATE 200 MG/1
200 CAPSULE ORAL 3 TIMES DAILY PRN
Qty: 30 CAPSULE | Refills: 0 | Status: SHIPPED | OUTPATIENT
Start: 2023-01-20 | End: 2023-01-27

## 2023-01-20 SDOH — ECONOMIC STABILITY: FOOD INSECURITY: WITHIN THE PAST 12 MONTHS, THE FOOD YOU BOUGHT JUST DIDN'T LAST AND YOU DIDN'T HAVE MONEY TO GET MORE.: NEVER TRUE

## 2023-01-20 SDOH — ECONOMIC STABILITY: FOOD INSECURITY: WITHIN THE PAST 12 MONTHS, YOU WORRIED THAT YOUR FOOD WOULD RUN OUT BEFORE YOU GOT MONEY TO BUY MORE.: NEVER TRUE

## 2023-01-20 ASSESSMENT — ENCOUNTER SYMPTOMS
RHINORRHEA: 1
ABDOMINAL PAIN: 0
SINUS PAIN: 1
SINUS PRESSURE: 1
COUGH: 1
SHORTNESS OF BREATH: 0
WHEEZING: 0

## 2023-01-20 ASSESSMENT — PATIENT HEALTH QUESTIONNAIRE - PHQ9
SUM OF ALL RESPONSES TO PHQ QUESTIONS 1-9: 0
SUM OF ALL RESPONSES TO PHQ9 QUESTIONS 1 & 2: 0
SUM OF ALL RESPONSES TO PHQ QUESTIONS 1-9: 0
2. FEELING DOWN, DEPRESSED OR HOPELESS: 0
SUM OF ALL RESPONSES TO PHQ QUESTIONS 1-9: 0
SUM OF ALL RESPONSES TO PHQ QUESTIONS 1-9: 0
1. LITTLE INTEREST OR PLEASURE IN DOING THINGS: 0

## 2023-01-20 ASSESSMENT — SOCIAL DETERMINANTS OF HEALTH (SDOH): HOW HARD IS IT FOR YOU TO PAY FOR THE VERY BASICS LIKE FOOD, HOUSING, MEDICAL CARE, AND HEATING?: NOT HARD AT ALL

## 2023-01-20 NOTE — PROGRESS NOTES
Uyen Ulloa (:  1985) is a 40 y.o. female,Established patient, here for evaluation of the following chief complaint(s):  Cough (Under tongue hurts but no sore throat, runny nose, dry cough, headache, ears feel full, sinus pressure is almost gone. Sick for 10 days. Daughter was sick 3 days prior to pt getting sick. Pts daughter was Covid negative and pt did not test. No SOB, no body aches and no chills. Pt tried Advil, stephon pot, and Mucinex D)         ASSESSMENT/PLAN:  1. Acute bacterial sinusitis  Take medications as prescribed, follow pt education included. 2. Cough, unspecified type  Ongoing, take medication as prescribed, follow pt education included. Return if symptoms worsen or fail to improve. Subjective   SUBJECTIVE/OBJECTIVE:  HPI  Chief Complaint   Patient presents with    Cough     Under tongue hurts but no sore throat, runny nose, dry cough, headache, ears feel full, sinus pressure is almost gone. Sick for 10 days. Daughter was sick 3 days prior to pt getting sick. Pts daughter was Covid negative and pt did not test. No SOB, no body aches and no chills. Pt tried Advil, stephon pot, and Mucinex D   Presents today with above complaints. Dry annoying cough, sinus pressure/congestion, PND, clear drainage. For the last 10 days. Lymph node on right side is sore. Gilda abd pain, n/v, sore throat, fevers. Ears feel full L>R. Current Outpatient Medications   Medication Sig Dispense Refill    cefUROXime (CEFTIN) 250 MG tablet Take 1 tablet by mouth 2 times daily for 7 days 14 tablet 0    methylPREDNISolone (MEDROL DOSEPACK) 4 MG tablet Take by mouth. 1 kit 0    benzonatate (TESSALON) 200 MG capsule Take 1 capsule by mouth 3 times daily as needed for Cough 30 capsule 0    levonorgestrel-ethinyl estradiol (AVIANE;ALESSE;LESSINA) 0.1-20 MG-MCG per tablet 1 tablet       No current facility-administered medications for this visit.         Past Medical History:   Diagnosis Date Contact lens/glasses fitting      (spontaneous vaginal delivery)         Review of Systems   Constitutional:  Negative for activity change, fatigue and fever. HENT:  Positive for congestion, postnasal drip, rhinorrhea, sinus pressure and sinus pain. Respiratory:  Positive for cough. Negative for shortness of breath and wheezing. Cardiovascular:  Negative for chest pain, palpitations and leg swelling. Gastrointestinal:  Negative for abdominal pain. Objective   Physical Exam  HENT:      Head: Normocephalic and atraumatic. Nose: Rhinorrhea present. Right Sinus: Maxillary sinus tenderness present. Left Sinus: Maxillary sinus tenderness present. Mouth/Throat:      Mouth: Mucous membranes are moist.      Pharynx: Oropharynx is clear. Eyes:      Extraocular Movements: Extraocular movements intact. Conjunctiva/sclera: Conjunctivae normal.      Pupils: Pupils are equal, round, and reactive to light. Neck:      Vascular: No carotid bruit. Cardiovascular:      Rate and Rhythm: Normal rate and regular rhythm. Heart sounds: Normal heart sounds. Pulmonary:      Effort: Pulmonary effort is normal.      Breath sounds: Normal breath sounds. Abdominal:      General: Bowel sounds are normal.      Palpations: Abdomen is soft. Tenderness: There is no abdominal tenderness. Lymphadenopathy:      Cervical: No cervical adenopathy. Skin:     General: Skin is warm and dry.             --IVETTE Lam - NP

## 2023-02-27 RX ORDER — CEFUROXIME AXETIL 250 MG/1
TABLET ORAL
Qty: 14 TABLET | Refills: 0 | OUTPATIENT
Start: 2023-02-27

## 2023-02-27 RX ORDER — METHYLPREDNISOLONE 4 MG/1
TABLET ORAL
Qty: 21 TABLET | OUTPATIENT
Start: 2023-02-27

## 2023-02-27 RX ORDER — BENZONATATE 200 MG/1
CAPSULE ORAL
Qty: 30 CAPSULE | Refills: 0 | OUTPATIENT
Start: 2023-02-27

## 2023-03-23 NOTE — PROGRESS NOTES
Alert and oriented. Agreeable to procedure. Consent signed by pt.   Electronically signed by Renaldo Oglesby RN on 12/22/2017 at 10:00 AM
Ambul to BR . Gait steady. Voided 300ml clear yellow urine.
Drowsy. Awakens easily. Moves all extremities easily. Sandrine po          Well.
Eyes open to stimuli. Moving randomly. Oral airway removed. No complaints voiced. Resting quietly.
Hand off report to Ami Barragan RN.
Patient admitted to PACU from OR. Patient asleep with oral airway intact. Resp easy unlabored on 5L NC with SaO2 99%. Monitor in SR. Lower back dressing dry and intact. VSS. IV patent to left hand.
Sandrine  Po drink well. Napping. Awakens easily to name.
Sandrine po snack and drink well. Requests to rest in bed a while longer. Back pain tolerable. Refuses pain med, will notify this RN if changes mind.
Sleeping. Resps easy and regular.
States pain level tolerable. Resting quietly on her left side. No increase to drainage to back dressing. Wants to see her . VSS. Stable for transfer to ACU room 3 by cart.
pain level is established preoperatively using age appropriate pain scale.
Labs/Medications

## 2023-05-16 LAB
CHOLEST SERPL-MCNC: 142 MG/DL (ref 0–199)
GLUCOSE SERPL-MCNC: 89 MG/DL (ref 70–99)
HDLC SERPL-MCNC: 60 MG/DL (ref 40–60)
LDLC SERPL CALC-MCNC: 70 MG/DL
TRIGL SERPL-MCNC: 59 MG/DL (ref 0–150)

## 2023-08-12 LAB
ALBUMIN SERPL-MCNC: 4.9 G/DL (ref 3.4–5)
ALP SERPL-CCNC: 49 U/L (ref 40–129)
ALT SERPL-CCNC: 15 U/L (ref 10–40)
AST SERPL-CCNC: 18 U/L (ref 15–37)
BILIRUB DIRECT SERPL-MCNC: <0.2 MG/DL (ref 0–0.3)
BILIRUB INDIRECT SERPL-MCNC: NORMAL MG/DL (ref 0–1)
BILIRUB SERPL-MCNC: 0.7 MG/DL (ref 0–1)
PROT SERPL-MCNC: 7.2 G/DL (ref 6.4–8.2)

## 2023-09-22 LAB
ALBUMIN SERPL-MCNC: 4.3 G/DL (ref 3.4–5)
ALP SERPL-CCNC: 36 U/L (ref 40–129)
ALT SERPL-CCNC: 11 U/L (ref 10–40)
AST SERPL-CCNC: 17 U/L (ref 15–37)
BILIRUB DIRECT SERPL-MCNC: <0.2 MG/DL (ref 0–0.3)
BILIRUB INDIRECT SERPL-MCNC: ABNORMAL MG/DL (ref 0–1)
BILIRUB SERPL-MCNC: 0.3 MG/DL (ref 0–1)
PROT SERPL-MCNC: 6.8 G/DL (ref 6.4–8.2)

## 2023-12-11 ENCOUNTER — OFFICE VISIT (OUTPATIENT)
Dept: FAMILY MEDICINE CLINIC | Age: 38
End: 2023-12-11
Payer: COMMERCIAL

## 2023-12-11 VITALS
BODY MASS INDEX: 26.18 KG/M2 | TEMPERATURE: 98.2 F | WEIGHT: 166.8 LBS | DIASTOLIC BLOOD PRESSURE: 70 MMHG | OXYGEN SATURATION: 98 % | HEIGHT: 67 IN | SYSTOLIC BLOOD PRESSURE: 116 MMHG | HEART RATE: 72 BPM

## 2023-12-11 DIAGNOSIS — Z00.00 PHYSICAL EXAM, ANNUAL: Primary | ICD-10-CM

## 2023-12-11 PROBLEM — M46.1 SACROILIITIS (HCC): Status: RESOLVED | Noted: 2017-07-18 | Resolved: 2023-12-11

## 2023-12-11 PROCEDURE — 99395 PREV VISIT EST AGE 18-39: CPT | Performed by: INTERNAL MEDICINE

## 2023-12-11 SDOH — ECONOMIC STABILITY: HOUSING INSECURITY
IN THE LAST 12 MONTHS, WAS THERE A TIME WHEN YOU DID NOT HAVE A STEADY PLACE TO SLEEP OR SLEPT IN A SHELTER (INCLUDING NOW)?: NO

## 2023-12-11 SDOH — ECONOMIC STABILITY: INCOME INSECURITY: HOW HARD IS IT FOR YOU TO PAY FOR THE VERY BASICS LIKE FOOD, HOUSING, MEDICAL CARE, AND HEATING?: NOT HARD AT ALL

## 2023-12-11 SDOH — ECONOMIC STABILITY: FOOD INSECURITY: WITHIN THE PAST 12 MONTHS, THE FOOD YOU BOUGHT JUST DIDN'T LAST AND YOU DIDN'T HAVE MONEY TO GET MORE.: NEVER TRUE

## 2023-12-11 SDOH — ECONOMIC STABILITY: FOOD INSECURITY: WITHIN THE PAST 12 MONTHS, YOU WORRIED THAT YOUR FOOD WOULD RUN OUT BEFORE YOU GOT MONEY TO BUY MORE.: NEVER TRUE

## 2023-12-11 ASSESSMENT — PATIENT HEALTH QUESTIONNAIRE - PHQ9
2. FEELING DOWN, DEPRESSED OR HOPELESS: 0
1. LITTLE INTEREST OR PLEASURE IN DOING THINGS: 0
SUM OF ALL RESPONSES TO PHQ9 QUESTIONS 1 & 2: 0
SUM OF ALL RESPONSES TO PHQ QUESTIONS 1-9: 0

## 2023-12-11 NOTE — PROGRESS NOTES
SUBJECTIVE:  Reginaldo Yip is a 41 y.o. female being evaluated for:    Chief Complaint   Patient presents with    Medicare AWV       HPI   One of her maternal aunts with breast cancer age 79 NO lump  could be hereditary and getting genetic testing Toe nail fungus on Lamisil  just finished it    OB/ Gyn appointment in January Flu shot at [employer] [facility]    NO back pain since discectomy of 2017    Yeast infection with birth control every month,   On diflucan 150 monthly   No Known Allergies  Current Outpatient Medications   Medication Sig Dispense Refill    levonorgestrel-ethinyl estradiol (AVIANE;ALESSE;LESSINA) 0.1-20 MG-MCG per tablet 1 tablet       No current facility-administered medications for this visit. Social History     Socioeconomic History    Marital status:      Spouse name: Not on file    Number of children: Not on file    Years of education: Not on file    Highest education level: Not on file   Occupational History    Not on file   Tobacco Use    Smoking status: Never    Smokeless tobacco: Never   Vaping Use    Vaping Use: Never used   Substance and Sexual Activity    Alcohol use: No    Drug use: No    Sexual activity: Yes     Partners: Male   Other Topics Concern    Not on file   Social History Narrative    Not on file     Social Determinants of Health     Financial Resource Strain: Low Risk  (12/11/2023)    Overall Financial Resource Strain (CARDIA)     Difficulty of Paying Living Expenses: Not hard at all   Food Insecurity: No Food Insecurity (12/11/2023)    Hunger Vital Sign     Worried About Running Out of Food in the Last Year: Never true     [address] in the Last Year: Never true   Transportation Needs: Unknown (12/11/2023)    PRAPARE - Transportation     Lack of Transportation (Medical): Not on file     Lack of Transportation (Non-Medical):  No   Physical Activity: Not on file   Stress: Not on file   Social Connections: Not on file   Intimate Partner Violence: Not on file

## 2024-01-23 LAB
HPV HR 12 DNA SPEC QL NAA+PROBE: NOT DETECTED
HPV16 DNA SPEC QL NAA+PROBE: NOT DETECTED
HPV16+18+H RISK 12 DNA SPEC-IMP: NORMAL
HPV18 DNA SPEC QL NAA+PROBE: NOT DETECTED

## 2024-05-16 LAB
C TRACH DNA UR QL NAA+PROBE: NEGATIVE
N GONORRHOEA DNA UR QL NAA+PROBE: NEGATIVE

## 2024-05-31 LAB
CHOLEST SERPL-MCNC: 123 MG/DL (ref 0–199)
GLUCOSE SERPL-MCNC: 89 MG/DL (ref 70–99)
HDLC SERPL-MCNC: 67 MG/DL (ref 40–60)
LDLC SERPL CALC-MCNC: 49 MG/DL
TRIGL SERPL-MCNC: 35 MG/DL (ref 0–150)

## 2024-07-22 LAB
REASON FOR REJECTION: NORMAL
REJECTED TEST: NORMAL

## 2024-07-24 LAB
C TRACH DNA CVX QL NAA+PROBE: NEGATIVE
N GONORRHOEA DNA CERV MUCUS QL NAA+PROBE: NEGATIVE

## 2024-09-30 NOTE — TELEPHONE ENCOUNTER
Dr. Sanchez, patient interested in free prenatal vitamins and iron per response from Maternity Risk Survey.    Order for [facility] Baby Box pending for your convenience.    Thank you,  Suzanne Rangel, PharmD  Ambulatory Care Clinical Pharmacist- [facility] Clinical Pharmacy  Department, toll free: [phone]  [facility]      =======================================================================    [facility] CLINICAL PHARMACY REVIEW - Be Well With Baby    SUBJECTIVE  Ana Solomon is a 39 y.o. female currently identified as interested in receiving a BS \"Baby Box\" containing a 1 year supply of prenatal vitamins from [facility].    Contents of Baby Box:  Welcome Letter  6 month supply of Nature's Blend Prenatal Multivitamin with Minerals (Take 1 softgel once daily) with 1 refill    Thank you  Suzanne Rangel, PharmD  Ambulatory Care Clinical Pharmacist- [facility] Clinical Pharmacy  Department, toll free: [phone]  [facility]

## 2024-12-06 LAB
BASOPHILS # BLD: 0 K/UL (ref 0–0.2)
BASOPHILS NFR BLD: 0.3 %
BLD GP AB SCN SERPL QL: NORMAL
DEPRECATED RDW RBC AUTO: 12.2 % (ref 12.4–15.4)
EOSINOPHIL # BLD: 0 K/UL (ref 0–0.6)
EOSINOPHIL NFR BLD: 0.4 %
GLUCOSE 1H P 50 G GLC PO SERPL-MCNC: 124 MG/DL
HCT VFR BLD AUTO: 37 % (ref 36–48)
HGB BLD-MCNC: 12.6 G/DL (ref 12–16)
LYMPHOCYTES # BLD: 1.2 K/UL (ref 1–5.1)
LYMPHOCYTES NFR BLD: 14.4 %
MCH RBC QN AUTO: 31.2 PG (ref 26–34)
MCHC RBC AUTO-ENTMCNC: 34.1 G/DL (ref 31–36)
MCV RBC AUTO: 91.4 FL (ref 80–100)
MONOCYTES # BLD: 0.3 K/UL (ref 0–1.3)
MONOCYTES NFR BLD: 3.9 %
NEUTROPHILS # BLD: 6.6 K/UL (ref 1.7–7.7)
NEUTROPHILS NFR BLD: 81 %
PLATELET # BLD AUTO: 156 K/UL (ref 135–450)
PMV BLD AUTO: 7.1 FL (ref 5–10.5)
RBC # BLD AUTO: 4.04 M/UL (ref 4–5.2)
WBC # BLD AUTO: 8.2 K/UL (ref 4–11)

## 2024-12-07 LAB — REAGIN+T PALLIDUM IGG+IGM SERPL-IMP: NORMAL

## 2025-02-16 LAB — GP B STREP SPEC QL CULT: NORMAL

## 2025-02-17 LAB — GP B STREP SPEC QL CULT: NORMAL

## 2025-03-13 NOTE — PATIENT INSTRUCTIONS
Please call your pharmacy if you need any refills of your medication(s). Please call our office at [phone] if you don't hear from us about your test results. Bring an accurate list of your medications with you at every appointment to ensure that we have the correct information. Our office hours are: Monday - Friday 7 am- 5 pm; Saturdays vary on doctor working.     Phone lines turn on at 8 am.
PAST MEDICAL HISTORY:  CAD (coronary artery disease)     Diverticulosis of intestine without bleeding, unspecified intestinal tract location     Stented coronary artery

## (undated) DEVICE — STERILE POLYISOPRENE POWDER-FREE SURGICAL GLOVES: Brand: PROTEXIS

## (undated) DEVICE — UNIVERSAL BLOCK TRAY: Brand: MEDLINE INDUSTRIES, INC.

## (undated) DEVICE — ALCOHOL RUBBING 16OZ 70% ISO

## (undated) DEVICE — CHLORAPREP 26ML ORANGE

## (undated) DEVICE — GAUZE,SPONGE,4"X4",16PLY,STRL,LF,10/TRAY: Brand: MEDLINE

## (undated) DEVICE — TOWEL OR BLUEE 16X26IN ST 8 PACK ORB08 16X26ORTWL